# Patient Record
Sex: FEMALE | Race: WHITE | NOT HISPANIC OR LATINO | Employment: OTHER | URBAN - NONMETROPOLITAN AREA
[De-identification: names, ages, dates, MRNs, and addresses within clinical notes are randomized per-mention and may not be internally consistent; named-entity substitution may affect disease eponyms.]

---

## 2017-03-14 ENCOUNTER — OFFICE VISIT (OUTPATIENT)
Dept: OBSTETRICS AND GYNECOLOGY | Facility: CLINIC | Age: 37
End: 2017-03-14

## 2017-03-14 VITALS
SYSTOLIC BLOOD PRESSURE: 132 MMHG | DIASTOLIC BLOOD PRESSURE: 90 MMHG | WEIGHT: 216 LBS | BODY MASS INDEX: 33.9 KG/M2 | HEIGHT: 67 IN

## 2017-03-14 DIAGNOSIS — Z01.419 ENCOUNTER FOR GYNECOLOGICAL EXAMINATION WITHOUT ABNORMAL FINDING: Primary | ICD-10-CM

## 2017-03-14 DIAGNOSIS — Z12.4 SCREENING FOR MALIGNANT NEOPLASM OF CERVIX: ICD-10-CM

## 2017-03-14 PROCEDURE — 99385 PREV VISIT NEW AGE 18-39: CPT | Performed by: PHYSICIAN ASSISTANT

## 2017-03-14 RX ORDER — OMEPRAZOLE 40 MG/1
40 CAPSULE, DELAYED RELEASE ORAL DAILY
COMMUNITY

## 2017-03-14 RX ORDER — ESCITALOPRAM OXALATE 20 MG/1
20 TABLET ORAL DAILY
COMMUNITY

## 2017-03-14 RX ORDER — ACETAMINOPHEN/DIPHENHYDRAMINE 500MG-25MG
TABLET ORAL
Refills: 0 | COMMUNITY
Start: 2017-02-23 | End: 2019-05-16

## 2017-03-14 RX ORDER — LOSARTAN POTASSIUM 50 MG/1
100 TABLET ORAL DAILY
COMMUNITY
End: 2019-05-16

## 2017-03-14 RX ORDER — ROPINIROLE 1 MG/1
TABLET, FILM COATED ORAL
Refills: 0 | COMMUNITY
Start: 2017-03-10 | End: 2019-05-16

## 2017-03-14 RX ORDER — HYDROCODONE BITARTRATE AND ACETAMINOPHEN 10; 325 MG/1; MG/1
1 TABLET ORAL EVERY 8 HOURS PRN
Refills: 0 | COMMUNITY
Start: 2017-03-10 | End: 2022-05-12

## 2017-03-14 RX ORDER — RISPERIDONE 2 MG/1
TABLET ORAL
Refills: 0 | COMMUNITY
Start: 2017-02-13 | End: 2019-05-16

## 2017-03-14 RX ORDER — GLUCOSAMINE/CHONDR SU A SOD 167-133 MG
CAPSULE ORAL
Refills: 0 | COMMUNITY
Start: 2017-02-23 | End: 2019-05-16

## 2017-03-14 RX ORDER — GABAPENTIN 300 MG/1
300 CAPSULE ORAL 3 TIMES DAILY
COMMUNITY
End: 2021-03-11

## 2017-03-14 RX ORDER — ATORVASTATIN CALCIUM 40 MG/1
40 TABLET, FILM COATED ORAL DAILY
COMMUNITY

## 2017-03-14 RX ORDER — GLUCOSAMINE/CHONDR SU A SOD 750-600 MG
TABLET ORAL
Refills: 0 | COMMUNITY
Start: 2017-02-13 | End: 2020-12-01

## 2017-03-14 NOTE — PROGRESS NOTES
Subjective   Chief Complaint   Patient presents with   • Gynecologic Exam   • Contraception     consult for iud removal     Kim Ray is a 36 y.o. year old  presenting to be seen for her annual exam.   She has no complaints or concerns today  She has Mirena placed 2012--she had placed for menorrhagia in [place]--has no bleeding with Mirena  She desires removal and reinsertioin of new Mirena  She has had tubal     Past Medical History   Diagnosis Date   • Acid reflux    • Anxiety    • Arthritis    • Depression         Current Outpatient Prescriptions:   •  atorvastatin (LIPITOR) 40 MG tablet, Take 40 mg by mouth Daily., Disp: , Rfl:   •  escitalopram (LEXAPRO) 20 MG tablet, Take 20 mg by mouth Daily., Disp: , Rfl:   •  gabapentin (NEURONTIN) 300 MG capsule, Take 300 mg by mouth 3 (Three) Times a Day., Disp: , Rfl:   •  HYDROcodone-acetaminophen (NORCO)  MG per tablet, take 1 tablet by mouth every 8 hours, Disp: , Rfl: 0  •  losartan (COZAAR) 50 MG tablet, Take 100 mg by mouth Daily., Disp: , Rfl:   •  omeprazole (priLOSEC) 40 MG capsule, Take 40 mg by mouth Daily., Disp: , Rfl:   •  RA ASPIRIN EC ADULT LOW ST 81 MG EC tablet, , Disp: , Rfl: 0  •  RA NIACIN 500 MG tablet, , Disp: , Rfl: 0  •  RA VITAMIN D-3 2000 UNITS capsule, take 1 capsule by mouth once daily, Disp: , Rfl: 0  •  risperiDONE (risperDAL) 2 MG tablet, , Disp: , Rfl: 0  •  rOPINIRole (REQUIP) 1 MG tablet, take 1 tablet by mouth 1 TO 3 HOURS BEFORE BEDTIME  daily, Disp: , Rfl: 0    Current Facility-Administered Medications:   •  [START ON 3/15/2017] levonorgestrel (MIRENA) 20 MCG/24HR IUD 1 each, 1 each, Intrauterine, Once, Janette Coleman PA-C   Allergies   Allergen Reactions   • Bactrim [Sulfamethoxazole-Trimethoprim]    • Ciprofloxacin    • Ibuprofen    • Penicillins       Past Surgical History   Procedure Laterality Date   • Cervical biopsy  w/ loop electrode excision       10/2000, 2011   •  section     • Tubal  "abdominal ligation        Social History     Social History   • Marital status:      Spouse name: N/A   • Number of children: N/A   • Years of education: N/A     Occupational History   • Not on file.     Social History Main Topics   • Smoking status: Never Smoker   • Smokeless tobacco: Not on file   • Alcohol use No   • Drug use: No   • Sexual activity: Defer     Other Topics Concern   • Not on file     Social History Narrative   • No narrative on file      Family History   Problem Relation Age of Onset   • Diabetes Father    • Hyperlipidemia Mother    • Hypertension Mother    • Thyroid disease Sister    • Diabetes Paternal Grandmother    • Skin cancer Maternal Grandfather        The following portions of the patient's history were reviewed and updated as appropriate:problem list, current medications, allergies, past family history, past medical history, past social history and past surgical history.    Review of Systems   Constitutional: Negative.    HENT: Positive for congestion.    Respiratory: Positive for cough.    Musculoskeletal: Positive for arthralgias.   Psychiatric/Behavioral: Positive for dysphoric mood.           Objective   Visit Vitals   • /90   • Ht 67\" (170.2 cm)   • Wt 216 lb (98 kg)   • LMP 01/01/2012   • BMI 33.83 kg/m2       Physical Exam   Constitutional: She appears well-developed and well-nourished.   Pulmonary/Chest: Right breast exhibits no inverted nipple, no mass, no nipple discharge, no skin change and no tenderness. Left breast exhibits no inverted nipple, no mass, no nipple discharge, no skin change and no tenderness.   Abdominal: Soft. Normal appearance. She exhibits no distension and no mass. There is no tenderness.   Genitourinary: Vagina normal and uterus normal. There is no tenderness or lesion on the right labia. There is no tenderness or lesion on the left labia. Cervix exhibits no motion tenderness and no discharge. Right adnexum displays no mass and no tenderness. " Left adnexum displays no mass and no tenderness.   Genitourinary Comments: IUD STRINGS VISABLE   Skin: Skin is warm and dry.   Psychiatric: She has a normal mood and affect. Her behavior is normal.            Assessment /Plan      Kim was seen today for gynecologic exam and contraception.    Diagnoses and all orders for this visit:    Encounter for gynecological examination without abnormal finding  -     levonorgestrel (MIRENA) 20 MCG/24HR IUD 1 each; 1 each by Intrauterine route 1 (One) Time.    Screening for malignant neoplasm of cervix  -     Liquid-based Pap Smear, Screening; Future                 This note was electronically signed.    Janette Coleman PA-C   March 14, 2017

## 2017-03-28 DIAGNOSIS — Z12.4 SCREENING FOR MALIGNANT NEOPLASM OF CERVIX: ICD-10-CM

## 2017-04-10 ENCOUNTER — OFFICE VISIT (OUTPATIENT)
Dept: OBSTETRICS AND GYNECOLOGY | Facility: CLINIC | Age: 37
End: 2017-04-10

## 2017-04-10 VITALS
DIASTOLIC BLOOD PRESSURE: 76 MMHG | HEIGHT: 66 IN | BODY MASS INDEX: 35.03 KG/M2 | SYSTOLIC BLOOD PRESSURE: 122 MMHG | WEIGHT: 218 LBS

## 2017-04-10 DIAGNOSIS — Z30.433 ENCOUNTER FOR REMOVAL AND REINSERTION OF INTRAUTERINE CONTRACEPTIVE DEVICE: Primary | ICD-10-CM

## 2017-04-10 LAB
B-HCG UR QL: NEGATIVE
INTERNAL NEGATIVE CONTROL: NEGATIVE
INTERNAL POSITIVE CONTROL: POSITIVE
Lab: NORMAL

## 2017-04-10 PROCEDURE — 81025 URINE PREGNANCY TEST: CPT | Performed by: PHYSICIAN ASSISTANT

## 2017-04-10 PROCEDURE — 58300 INSERT INTRAUTERINE DEVICE: CPT | Performed by: PHYSICIAN ASSISTANT

## 2017-04-10 PROCEDURE — 58301 REMOVE INTRAUTERINE DEVICE: CPT | Performed by: PHYSICIAN ASSISTANT

## 2017-04-10 NOTE — PROGRESS NOTES
IUD Removal and Immediate Reinsertion    Patient's last menstrual period was 01/01/2012.    Date of procedure:  4/10/2017    Risks and benefits discussed? yes  All questions answered? yes  Consents given by the patient  Written consent obtained? yes  Reason for removal: Device expiration    Local anesthesia used:  no    Procedure documentation:    A speculum was placed in order to view the cervix.  A tenaculum did not need to be placed on the anterior cervical lip.  Cervical dilation did not need to be performed in order to access the string.  The IUD string was easily seen.  The string was grasped and the IUD was removed without difficulty.  The IUD did not appear to be adherent to the uterine cavity. It was removed intact.    A sterile speculum was replaced and the cervix was cleansed with an antiseptic solution.  Vaginal discharge was scant.  The anterior lip of the cervix was grasped with a tenaculum and the uterine cavity was gently sounded.  There was no difficulty passing the sound through the cervix.  Cervical dilation did not need to be performed prior to placing the IUD.  The uterus was anteverted and sounded to 7 cms.  The Mirena was then prepared per the manufacturers instructions.    The Mirena was advanced to a point 2 cms from the fundus and then the arms were released from the sheath.  The device was advanced to the fundus and the device was released fully from the sheath.. The string was cut 3 cms in length.  Bleeding from the cervix was scant.    She tolerated the procedure without any difficulty.     Post procedure instructions: Follow up in 5 weeks for IUD check--call office if any heavy bleeding, significant pain or cramping, fever or chills.       This note was electronically signed.  Janette Magdaleno  April 10, 2017

## 2017-05-15 ENCOUNTER — OFFICE VISIT (OUTPATIENT)
Dept: OBSTETRICS AND GYNECOLOGY | Facility: CLINIC | Age: 37
End: 2017-05-15

## 2017-05-15 VITALS
WEIGHT: 218 LBS | SYSTOLIC BLOOD PRESSURE: 132 MMHG | HEIGHT: 66 IN | DIASTOLIC BLOOD PRESSURE: 90 MMHG | BODY MASS INDEX: 35.03 KG/M2

## 2017-05-15 DIAGNOSIS — Z30.431 ENCOUNTER FOR ROUTINE CHECKING OF INTRAUTERINE CONTRACEPTIVE DEVICE: Primary | ICD-10-CM

## 2017-05-15 PROCEDURE — 99212 OFFICE O/P EST SF 10 MIN: CPT | Performed by: PHYSICIAN ASSISTANT

## 2019-04-02 ENCOUNTER — PREP FOR SURGERY (OUTPATIENT)
Dept: OTHER | Facility: HOSPITAL | Age: 39
End: 2019-04-02

## 2019-04-02 ENCOUNTER — OFFICE VISIT (OUTPATIENT)
Dept: NEUROSURGERY | Facility: CLINIC | Age: 39
End: 2019-04-02

## 2019-04-02 VITALS — BODY MASS INDEX: 35.03 KG/M2 | WEIGHT: 218 LBS | HEIGHT: 66 IN

## 2019-04-02 DIAGNOSIS — M48.02 CERVICAL STENOSIS OF SPINAL CANAL: Primary | ICD-10-CM

## 2019-04-02 DIAGNOSIS — M50.20 HERNIATION OF CERVICAL INTERVERTEBRAL DISC: Primary | ICD-10-CM

## 2019-04-02 DIAGNOSIS — M50.20 HERNIATION OF CERVICAL INTERVERTEBRAL DISC: ICD-10-CM

## 2019-04-02 PROCEDURE — 99243 OFF/OP CNSLTJ NEW/EST LOW 30: CPT | Performed by: NEUROLOGICAL SURGERY

## 2019-04-02 NOTE — PROGRESS NOTES
Subjective   Patient ID: Kim Ray is a 38 y.o. female is being seen for consultation today at the request of Bonifacio Castillo, *  Chief Complaint: Right shoulder and arm pain    History of Present Illness: The patient is a 38-year-old woman from [facility] with a history of pain in the neck intermittently for many years often treated with physical therapy.  She developed a more constant neck to the right shoulder and arm several months ago and this has not seen any relief with therapy or medications.  She has not had any change of gait or balance, no numbness or weakness or clumsiness of her hands.    Review of Radiographic Studies:  Cervical MRI scan shows severe cervical spinal stenosis at C5-6 and C6-7 with displacement and flattening of the cervical spinal cord, and at C6-7 on the right there is a distinct herniation at the foraminal level.  There is a mild kyphotic curve at the C5-7 level.    The following portions of the patient's history were reviewed, updated as appropriate and approved: allergies, current medications, past family history, past medical history, past social history, past surgical history, review of systems and problem list.  Review of Systems   Constitutional: Negative for activity change, appetite change, chills, diaphoresis, fatigue, fever and unexpected weight change.   HENT: Negative for congestion, dental problem, drooling, ear discharge, ear pain, facial swelling, hearing loss, mouth sores, nosebleeds, postnasal drip, rhinorrhea, sinus pressure, sneezing, sore throat, tinnitus, trouble swallowing and voice change.    Eyes: Negative for photophobia, pain, discharge, redness, itching and visual disturbance.   Respiratory: Negative for apnea, cough, choking, chest tightness, shortness of breath, wheezing and stridor.    Cardiovascular: Negative for chest pain, palpitations and leg swelling.   Gastrointestinal: Negative for abdominal distention, abdominal pain, anal  bleeding, blood in stool, constipation, diarrhea, nausea, rectal pain and vomiting.   Endocrine: Negative for cold intolerance, heat intolerance, polydipsia, polyphagia and polyuria.   Genitourinary: Negative for decreased urine volume, difficulty urinating, dysuria, enuresis, flank pain, frequency, genital sores, hematuria and urgency.   Musculoskeletal: Positive for arthralgias, back pain and neck pain. Negative for gait problem, joint swelling, myalgias and neck stiffness.   Skin: Negative for color change, pallor, rash and wound.   Allergic/Immunologic: Negative for environmental allergies, food allergies and immunocompromised state.   Neurological: Positive for weakness and headaches. Negative for dizziness, tremors, seizures, syncope, facial asymmetry, speech difficulty, light-headedness and numbness.   Hematological: Negative for adenopathy. Does not bruise/bleed easily.   Psychiatric/Behavioral: Negative for agitation, behavioral problems, confusion, decreased concentration, dysphoric mood, hallucinations, self-injury, sleep disturbance and suicidal ideas. The patient is not nervous/anxious and is not hyperactive.        Objective     NEUROLOGICAL EXAMINATION:      MENTAL STATUS:  Alert and oriented.  Speech intact.  Recent and remote memory intact.      CRANIAL NERVES:  Cranial nerve II:  Visual fields are full.  Cranial nerves III, IV and VI:  PERRLADC.  Extraocular movements are intact.  Nystagmus is not present.  Cranial nerve V:  Facial sensation is intact.  Cranial nerve VII:  Muscles of facial expression reveal no asymmetry.  Cranial nerve VIII:  Hearing is intact.  Cranial nerves IX and X:  Palate elevates symmetrically.  Cranial nerve XI:  Shoulder shrug is intact.  Cranial nerve XII:  Tongue is midline without evidence of atrophy or fasciculation.    MUSCULOSKELETAL: Shoulder range of motion intact.  Cervical range of motion intact.    MOTOR: Intact deltoid, biceps, triceps bilaterally.  No hand  atrophy.  No gait instability.    SENSATION: No focal sensory loss.    REFLEXES:  DTR 1+ biceps, triceps, knee, and ankle bilaterally.    Assessment   Cervical disc herniation C6-7 right, right C7 radiculopathy; severe spinal stenosis with spinal cord compression C5-6 and C6-7 due to central cervical disc herniations, no active myelopathy at this time.       Plan   Recommend ACDF at C5-6 and C6-7 for relief of the spinal cord compression in view of the high degree of stenosis, and to relieve the right C7 radiculopathy.       Chang Lackey MD

## 2019-05-16 ENCOUNTER — APPOINTMENT (OUTPATIENT)
Dept: PREADMISSION TESTING | Facility: HOSPITAL | Age: 39
End: 2019-05-16

## 2019-05-16 ENCOUNTER — ANESTHESIA EVENT (OUTPATIENT)
Dept: PERIOP | Facility: HOSPITAL | Age: 39
End: 2019-05-16

## 2019-05-16 VITALS — HEIGHT: 66 IN | BODY MASS INDEX: 32.03 KG/M2 | WEIGHT: 199.3 LBS

## 2019-05-16 DIAGNOSIS — M50.20 HERNIATION OF CERVICAL INTERVERTEBRAL DISC: ICD-10-CM

## 2019-05-16 LAB
ANION GAP SERPL CALCULATED.3IONS-SCNC: 14 MMOL/L
B-HCG UR QL: NEGATIVE
BUN BLD-MCNC: 15 MG/DL (ref 6–20)
BUN/CREAT SERPL: 22.1 (ref 7–25)
CALCIUM SPEC-SCNC: 9.2 MG/DL (ref 8.6–10.5)
CHLORIDE SERPL-SCNC: 101 MMOL/L (ref 98–107)
CO2 SERPL-SCNC: 25 MMOL/L (ref 22–29)
CREAT BLD-MCNC: 0.68 MG/DL (ref 0.57–1)
DEPRECATED RDW RBC AUTO: 43 FL (ref 37–54)
ERYTHROCYTE [DISTWIDTH] IN BLOOD BY AUTOMATED COUNT: 14.1 % (ref 12.3–15.4)
GFR SERPL CREATININE-BSD FRML MDRD: 96 ML/MIN/1.73
GLUCOSE BLD-MCNC: 145 MG/DL (ref 65–99)
HBA1C MFR BLD: 8.1 % (ref 4.8–5.6)
HCT VFR BLD AUTO: 45.1 % (ref 34–46.6)
HGB BLD-MCNC: 15 G/DL (ref 12–15.9)
MCH RBC QN AUTO: 27.8 PG (ref 26.6–33)
MCHC RBC AUTO-ENTMCNC: 33.3 G/DL (ref 31.5–35.7)
MCV RBC AUTO: 83.7 FL (ref 79–97)
MRSA DNA SPEC QL NAA+PROBE: NEGATIVE
PLATELET # BLD AUTO: 304 10*3/MM3 (ref 140–450)
PMV BLD AUTO: 11 FL (ref 6–12)
POTASSIUM BLD-SCNC: 4.3 MMOL/L (ref 3.5–5.2)
RBC # BLD AUTO: 5.39 10*6/MM3 (ref 3.77–5.28)
SODIUM BLD-SCNC: 140 MMOL/L (ref 136–145)
WBC NRBC COR # BLD: 17.19 10*3/MM3 (ref 3.4–10.8)

## 2019-05-16 PROCEDURE — 80048 BASIC METABOLIC PNL TOTAL CA: CPT | Performed by: PHYSICIAN ASSISTANT

## 2019-05-16 PROCEDURE — 83036 HEMOGLOBIN GLYCOSYLATED A1C: CPT | Performed by: ANESTHESIOLOGY

## 2019-05-16 PROCEDURE — 81025 URINE PREGNANCY TEST: CPT | Performed by: PHYSICIAN ASSISTANT

## 2019-05-16 PROCEDURE — 85027 COMPLETE CBC AUTOMATED: CPT | Performed by: PHYSICIAN ASSISTANT

## 2019-05-16 PROCEDURE — 87641 MR-STAPH DNA AMP PROBE: CPT | Performed by: PHYSICIAN ASSISTANT

## 2019-05-16 PROCEDURE — 93010 ELECTROCARDIOGRAM REPORT: CPT | Performed by: INTERNAL MEDICINE

## 2019-05-16 PROCEDURE — 93005 ELECTROCARDIOGRAM TRACING: CPT

## 2019-05-16 PROCEDURE — 36415 COLL VENOUS BLD VENIPUNCTURE: CPT

## 2019-05-16 RX ORDER — RISPERIDONE 4 MG/1
4 TABLET ORAL DAILY
COMMUNITY

## 2019-05-16 RX ORDER — FAMOTIDINE 10 MG/ML
20 INJECTION, SOLUTION INTRAVENOUS ONCE
Status: CANCELLED | OUTPATIENT
Start: 2019-05-16 | End: 2019-05-16

## 2019-05-16 RX ORDER — BUPROPION HYDROCHLORIDE 150 MG/1
150 TABLET, EXTENDED RELEASE ORAL 2 TIMES DAILY
COMMUNITY
End: 2019-06-18 | Stop reason: SDUPTHER

## 2019-05-16 RX ORDER — CETIRIZINE HYDROCHLORIDE 10 MG/1
10 TABLET ORAL DAILY
COMMUNITY

## 2019-05-16 RX ORDER — OXYBUTYNIN CHLORIDE 15 MG/1
15 TABLET, EXTENDED RELEASE ORAL DAILY
COMMUNITY
End: 2021-01-06

## 2019-05-16 RX ORDER — LOSARTAN POTASSIUM 100 MG/1
100 TABLET ORAL DAILY
COMMUNITY

## 2019-05-16 RX ORDER — FENOFIBRATE 160 MG/1
160 TABLET ORAL DAILY
COMMUNITY

## 2019-05-16 RX ORDER — ROPINIROLE 3 MG/1
3 TABLET, FILM COATED ORAL NIGHTLY
COMMUNITY

## 2019-05-16 NOTE — PAT
BACTROBAN APPLIED TO EACH NOSTRIL DURING PAT VISIT     Patient to apply Chlorhexadine wipes  to surgical area (as instructed) the night before procedure and the AM of procedure. Wipes provided.    ekg cleared per dr tang    Patient instructed to drink 20 ounces (or until full) of Gatorade or 20 ounces of G2 (if diabetic) and complete 1 hour before arrival time for procedure (NO RED Gatorade or G2)    Patient verbalized understanding.

## 2019-05-17 ENCOUNTER — APPOINTMENT (OUTPATIENT)
Dept: GENERAL RADIOLOGY | Facility: HOSPITAL | Age: 39
End: 2019-05-17

## 2019-05-17 ENCOUNTER — HOSPITAL ENCOUNTER (INPATIENT)
Facility: HOSPITAL | Age: 39
LOS: 1 days | Discharge: HOME | End: 2019-05-18
Attending: NEUROLOGICAL SURGERY | Admitting: NEUROLOGICAL SURGERY

## 2019-05-17 ENCOUNTER — ANESTHESIA (OUTPATIENT)
Dept: PERIOP | Facility: HOSPITAL | Age: 39
End: 2019-05-17

## 2019-05-17 DIAGNOSIS — Z01.419 ENCOUNTER FOR GYNECOLOGICAL EXAMINATION WITHOUT ABNORMAL FINDING: ICD-10-CM

## 2019-05-17 PROBLEM — M50.20 CERVICAL DISC HERNIATION: Status: ACTIVE | Noted: 2019-05-17

## 2019-05-17 LAB
B-HCG UR QL: NEGATIVE
GLUCOSE BLDC GLUCOMTR-MCNC: 136 MG/DL (ref 70–130)
GLUCOSE BLDC GLUCOMTR-MCNC: 150 MG/DL (ref 70–130)
GLUCOSE BLDC GLUCOMTR-MCNC: 154 MG/DL (ref 70–130)
GLUCOSE BLDC GLUCOMTR-MCNC: 169 MG/DL (ref 70–130)
INTERNAL NEGATIVE CONTROL: NEGATIVE
INTERNAL POSITIVE CONTROL: POSITIVE
Lab: NORMAL
POTASSIUM BLD-SCNC: 4.6 MMOL/L (ref 3.5–5.2)

## 2019-05-17 PROCEDURE — 25010000002 FENTANYL CITRATE (PF) 100 MCG/2ML SOLUTION: Performed by: NURSE ANESTHETIST, CERTIFIED REGISTERED

## 2019-05-17 PROCEDURE — 22551 ARTHRD ANT NTRBDY CERVICAL: CPT | Performed by: NEUROLOGICAL SURGERY

## 2019-05-17 PROCEDURE — 25010000002 HYDROMORPHONE 1 MG/ML SOLUTION: Performed by: NEUROLOGICAL SURGERY

## 2019-05-17 PROCEDURE — 0RG20A0 FUSION OF 2 OR MORE CERVICAL VERTEBRAL JOINTS WITH INTERBODY FUSION DEVICE, ANTERIOR APPROACH, ANTERIOR COLUMN, OPEN APPROACH: ICD-10-PCS | Performed by: NEUROLOGICAL SURGERY

## 2019-05-17 PROCEDURE — 63710000001 INSULIN LISPRO (HUMAN) PER 5 UNITS: Performed by: NEUROLOGICAL SURGERY

## 2019-05-17 PROCEDURE — C1713 ANCHOR/SCREW BN/BN,TIS/BN: HCPCS | Performed by: NEUROLOGICAL SURGERY

## 2019-05-17 PROCEDURE — 25010000002 PHENYLEPHRINE PER 1 ML: Performed by: NURSE ANESTHETIST, CERTIFIED REGISTERED

## 2019-05-17 PROCEDURE — 0RT30ZZ RESECTION OF CERVICAL VERTEBRAL DISC, OPEN APPROACH: ICD-10-PCS | Performed by: NEUROLOGICAL SURGERY

## 2019-05-17 PROCEDURE — 22552 ARTHRD ANT NTRBD CERVICAL EA: CPT | Performed by: NEUROLOGICAL SURGERY

## 2019-05-17 PROCEDURE — 25010000002 HYDROMORPHONE PER 4 MG: Performed by: NURSE ANESTHETIST, CERTIFIED REGISTERED

## 2019-05-17 PROCEDURE — 25010000002 ONDANSETRON PER 1 MG: Performed by: NURSE ANESTHETIST, CERTIFIED REGISTERED

## 2019-05-17 PROCEDURE — 25010000002 NEOSTIGMINE 10 MG/10ML SOLUTION: Performed by: NURSE ANESTHETIST, CERTIFIED REGISTERED

## 2019-05-17 PROCEDURE — 25010000002 MIDAZOLAM PER 1 MG: Performed by: ANESTHESIOLOGY

## 2019-05-17 PROCEDURE — 22853 INSJ BIOMECHANICAL DEVICE: CPT | Performed by: PHYSICIAN ASSISTANT

## 2019-05-17 PROCEDURE — 22845 INSERT SPINE FIXATION DEVICE: CPT | Performed by: PHYSICIAN ASSISTANT

## 2019-05-17 PROCEDURE — 84132 ASSAY OF SERUM POTASSIUM: CPT | Performed by: ANESTHESIOLOGY

## 2019-05-17 PROCEDURE — 99024 POSTOP FOLLOW-UP VISIT: CPT | Performed by: NEUROLOGICAL SURGERY

## 2019-05-17 PROCEDURE — 22552 ARTHRD ANT NTRBD CERVICAL EA: CPT | Performed by: PHYSICIAN ASSISTANT

## 2019-05-17 PROCEDURE — 82962 GLUCOSE BLOOD TEST: CPT

## 2019-05-17 PROCEDURE — 22845 INSERT SPINE FIXATION DEVICE: CPT | Performed by: NEUROLOGICAL SURGERY

## 2019-05-17 PROCEDURE — 25010000002 PROPOFOL 10 MG/ML EMULSION: Performed by: NURSE ANESTHETIST, CERTIFIED REGISTERED

## 2019-05-17 PROCEDURE — 25010000002 DEXAMETHASONE PER 1 MG: Performed by: NURSE ANESTHETIST, CERTIFIED REGISTERED

## 2019-05-17 PROCEDURE — 72020 X-RAY EXAM OF SPINE 1 VIEW: CPT

## 2019-05-17 PROCEDURE — 81025 URINE PREGNANCY TEST: CPT | Performed by: ANESTHESIOLOGY

## 2019-05-17 PROCEDURE — 94799 UNLISTED PULMONARY SVC/PX: CPT

## 2019-05-17 PROCEDURE — 25010000002 VANCOMYCIN 10 G RECONSTITUTED SOLUTION: Performed by: PHYSICIAN ASSISTANT

## 2019-05-17 PROCEDURE — G0378 HOSPITAL OBSERVATION PER HR: HCPCS

## 2019-05-17 PROCEDURE — 22551 ARTHRD ANT NTRBDY CERVICAL: CPT | Performed by: PHYSICIAN ASSISTANT

## 2019-05-17 PROCEDURE — 22853 INSJ BIOMECHANICAL DEVICE: CPT | Performed by: NEUROLOGICAL SURGERY

## 2019-05-17 DEVICE — WAX BONE HEMO AESCULAP 2.5GM: Type: IMPLANTABLE DEVICE | Site: SPINE CERVICAL | Status: FUNCTIONAL

## 2019-05-17 DEVICE — SCREW 7723515 ZEVO VAR ST 3.5MM X 15MM
Type: IMPLANTABLE DEVICE | Site: SPINE CERVICAL | Status: FUNCTIONAL
Brand: ZEVO™ ANTERIOR CERVICAL PLATE SYSTEM

## 2019-05-17 DEVICE — CAGE 5030664 ANATOMIC PTC 16X14X6MM
Type: IMPLANTABLE DEVICE | Site: SPINE CERVICAL | Status: FUNCTIONAL
Brand: ANATOMIC PEEK PTC CERVICAL FUSION SYSTEM

## 2019-05-17 DEVICE — CLIP LIGAT VASC HORIZON TI SM YEL 6CT: Type: IMPLANTABLE DEVICE | Site: SPINE CERVICAL | Status: FUNCTIONAL

## 2019-05-17 DEVICE — CLIP LIGAT VASC HORIZON TI MD BLU 6CT: Type: IMPLANTABLE DEVICE | Site: SPINE CERVICAL | Status: FUNCTIONAL

## 2019-05-17 DEVICE — PUTTY DBM GRAFTON 6CC: Type: IMPLANTABLE DEVICE | Site: SPINE CERVICAL | Status: FUNCTIONAL

## 2019-05-17 RX ORDER — SODIUM CHLORIDE 0.9 % (FLUSH) 0.9 %
3 SYRINGE (ML) INJECTION EVERY 12 HOURS SCHEDULED
Status: DISCONTINUED | OUTPATIENT
Start: 2019-05-17 | End: 2019-05-18

## 2019-05-17 RX ORDER — GABAPENTIN 300 MG/1
300 CAPSULE ORAL 3 TIMES DAILY
Status: DISCONTINUED | OUTPATIENT
Start: 2019-05-17 | End: 2019-05-18

## 2019-05-17 RX ORDER — CYCLOBENZAPRINE HCL 10 MG
10 TABLET ORAL 3 TIMES DAILY PRN
Status: DISCONTINUED | OUTPATIENT
Start: 2019-05-17 | End: 2019-05-18

## 2019-05-17 RX ORDER — ESCITALOPRAM OXALATE 20 MG/1
20 TABLET ORAL NIGHTLY
Status: DISCONTINUED | OUTPATIENT
Start: 2019-05-17 | End: 2019-05-18

## 2019-05-17 RX ORDER — SODIUM CHLORIDE 0.9 % (FLUSH) 0.9 %
3-10 SYRINGE (ML) INJECTION AS NEEDED
Status: DISCONTINUED | OUTPATIENT
Start: 2019-05-17 | End: 2019-05-18

## 2019-05-17 RX ORDER — LIDOCAINE HYDROCHLORIDE 10 MG/ML
INJECTION, SOLUTION EPIDURAL; INFILTRATION; INTRACAUDAL; PERINEURAL AS NEEDED
Status: DISCONTINUED | OUTPATIENT
Start: 2019-05-17 | End: 2019-05-17 | Stop reason: SURG

## 2019-05-17 RX ORDER — ROCURONIUM BROMIDE 10 MG/ML
INJECTION, SOLUTION INTRAVENOUS AS NEEDED
Status: DISCONTINUED | OUTPATIENT
Start: 2019-05-17 | End: 2019-05-17 | Stop reason: SURG

## 2019-05-17 RX ORDER — ONDANSETRON 2 MG/ML
INJECTION INTRAMUSCULAR; INTRAVENOUS AS NEEDED
Status: DISCONTINUED | OUTPATIENT
Start: 2019-05-17 | End: 2019-05-17 | Stop reason: SURG

## 2019-05-17 RX ORDER — ONDANSETRON 2 MG/ML
4 INJECTION INTRAMUSCULAR; INTRAVENOUS ONCE AS NEEDED
Status: DISCONTINUED | OUTPATIENT
Start: 2019-05-17 | End: 2019-05-17 | Stop reason: HOSPADM

## 2019-05-17 RX ORDER — RISPERIDONE 1 MG/1
4 TABLET ORAL NIGHTLY
Status: DISCONTINUED | OUTPATIENT
Start: 2019-05-17 | End: 2019-05-18

## 2019-05-17 RX ORDER — SODIUM CHLORIDE, SODIUM LACTATE, POTASSIUM CHLORIDE, CALCIUM CHLORIDE 600; 310; 30; 20 MG/100ML; MG/100ML; MG/100ML; MG/100ML
50 INJECTION, SOLUTION INTRAVENOUS CONTINUOUS
Status: DISCONTINUED | OUTPATIENT
Start: 2019-05-17 | End: 2019-05-18

## 2019-05-17 RX ORDER — GLYCOPYRROLATE 0.2 MG/ML
INJECTION INTRAMUSCULAR; INTRAVENOUS AS NEEDED
Status: DISCONTINUED | OUTPATIENT
Start: 2019-05-17 | End: 2019-05-17 | Stop reason: SURG

## 2019-05-17 RX ORDER — NICOTINE POLACRILEX 4 MG
15 LOZENGE BUCCAL
Status: DISCONTINUED | OUTPATIENT
Start: 2019-05-17 | End: 2019-05-18

## 2019-05-17 RX ORDER — MIDAZOLAM HYDROCHLORIDE 5 MG/ML
2 INJECTION INTRAMUSCULAR; INTRAVENOUS ONCE
Status: COMPLETED | OUTPATIENT
Start: 2019-05-17 | End: 2019-05-17

## 2019-05-17 RX ORDER — TEMAZEPAM 15 MG/1
15 CAPSULE ORAL NIGHTLY PRN
Status: DISCONTINUED | OUTPATIENT
Start: 2019-05-17 | End: 2019-05-18

## 2019-05-17 RX ORDER — ACETAMINOPHEN 325 MG/1
650 TABLET ORAL EVERY 4 HOURS PRN
Status: DISCONTINUED | OUTPATIENT
Start: 2019-05-17 | End: 2019-05-18

## 2019-05-17 RX ORDER — PANTOPRAZOLE SODIUM 40 MG/1
40 TABLET, DELAYED RELEASE ORAL EVERY MORNING
Status: DISCONTINUED | OUTPATIENT
Start: 2019-05-18 | End: 2019-05-18

## 2019-05-17 RX ORDER — CETIRIZINE HYDROCHLORIDE 10 MG/1
10 TABLET ORAL DAILY
Status: DISCONTINUED | OUTPATIENT
Start: 2019-05-18 | End: 2019-05-18

## 2019-05-17 RX ORDER — DEXAMETHASONE SODIUM PHOSPHATE 4 MG/ML
INJECTION, SOLUTION INTRA-ARTICULAR; INTRALESIONAL; INTRAMUSCULAR; INTRAVENOUS; SOFT TISSUE AS NEEDED
Status: DISCONTINUED | OUTPATIENT
Start: 2019-05-17 | End: 2019-05-17 | Stop reason: SURG

## 2019-05-17 RX ORDER — NALOXONE HCL 0.4 MG/ML
0.4 VIAL (ML) INJECTION
Status: DISCONTINUED | OUTPATIENT
Start: 2019-05-17 | End: 2019-05-18

## 2019-05-17 RX ORDER — SENNA AND DOCUSATE SODIUM 50; 8.6 MG/1; MG/1
1 TABLET, FILM COATED ORAL NIGHTLY PRN
Status: DISCONTINUED | OUTPATIENT
Start: 2019-05-17 | End: 2019-05-18

## 2019-05-17 RX ORDER — NEOSTIGMINE METHYLSULFATE 1 MG/ML
INJECTION, SOLUTION INTRAVENOUS AS NEEDED
Status: DISCONTINUED | OUTPATIENT
Start: 2019-05-17 | End: 2019-05-17 | Stop reason: SURG

## 2019-05-17 RX ORDER — SODIUM CHLORIDE, SODIUM LACTATE, POTASSIUM CHLORIDE, CALCIUM CHLORIDE 600; 310; 30; 20 MG/100ML; MG/100ML; MG/100ML; MG/100ML
9 INJECTION, SOLUTION INTRAVENOUS CONTINUOUS
Status: DISCONTINUED | OUTPATIENT
Start: 2019-05-17 | End: 2019-05-18

## 2019-05-17 RX ORDER — MAGNESIUM HYDROXIDE 1200 MG/15ML
LIQUID ORAL AS NEEDED
Status: DISCONTINUED | OUTPATIENT
Start: 2019-05-17 | End: 2019-05-17 | Stop reason: HOSPADM

## 2019-05-17 RX ORDER — ONDANSETRON 2 MG/ML
4 INJECTION INTRAMUSCULAR; INTRAVENOUS EVERY 6 HOURS PRN
Status: DISCONTINUED | OUTPATIENT
Start: 2019-05-17 | End: 2019-05-18

## 2019-05-17 RX ORDER — SODIUM CHLORIDE 0.9 % (FLUSH) 0.9 %
3-10 SYRINGE (ML) INJECTION AS NEEDED
Status: DISCONTINUED | OUTPATIENT
Start: 2019-05-17 | End: 2019-05-17 | Stop reason: HOSPADM

## 2019-05-17 RX ORDER — ONDANSETRON 4 MG/1
4 TABLET, FILM COATED ORAL EVERY 6 HOURS PRN
Status: DISCONTINUED | OUTPATIENT
Start: 2019-05-17 | End: 2019-05-18

## 2019-05-17 RX ORDER — PROPOFOL 10 MG/ML
VIAL (ML) INTRAVENOUS AS NEEDED
Status: DISCONTINUED | OUTPATIENT
Start: 2019-05-17 | End: 2019-05-17 | Stop reason: SURG

## 2019-05-17 RX ORDER — FENTANYL CITRATE 50 UG/ML
50 INJECTION, SOLUTION INTRAMUSCULAR; INTRAVENOUS
Status: DISCONTINUED | OUTPATIENT
Start: 2019-05-17 | End: 2019-05-17 | Stop reason: HOSPADM

## 2019-05-17 RX ORDER — HYDROMORPHONE HYDROCHLORIDE 1 MG/ML
0.5 INJECTION, SOLUTION INTRAMUSCULAR; INTRAVENOUS; SUBCUTANEOUS
Status: DISCONTINUED | OUTPATIENT
Start: 2019-05-17 | End: 2019-05-17 | Stop reason: HOSPADM

## 2019-05-17 RX ORDER — BUPROPION HYDROCHLORIDE 150 MG/1
150 TABLET, EXTENDED RELEASE ORAL EVERY 12 HOURS SCHEDULED
Status: DISCONTINUED | OUTPATIENT
Start: 2019-05-17 | End: 2019-05-18

## 2019-05-17 RX ORDER — SODIUM CHLORIDE 0.9 % (FLUSH) 0.9 %
3 SYRINGE (ML) INJECTION EVERY 12 HOURS SCHEDULED
Status: DISCONTINUED | OUTPATIENT
Start: 2019-05-17 | End: 2019-05-17 | Stop reason: HOSPADM

## 2019-05-17 RX ORDER — OXYCODONE AND ACETAMINOPHEN 10; 325 MG/1; MG/1
1 TABLET ORAL EVERY 4 HOURS PRN
Status: DISCONTINUED | OUTPATIENT
Start: 2019-05-17 | End: 2019-05-18

## 2019-05-17 RX ORDER — DEXTROSE MONOHYDRATE 25 G/50ML
25 INJECTION, SOLUTION INTRAVENOUS
Status: DISCONTINUED | OUTPATIENT
Start: 2019-05-17 | End: 2019-05-18

## 2019-05-17 RX ORDER — BISACODYL 10 MG
10 SUPPOSITORY, RECTAL RECTAL DAILY PRN
Status: DISCONTINUED | OUTPATIENT
Start: 2019-05-17 | End: 2019-05-18

## 2019-05-17 RX ORDER — FENOFIBRATE 145 MG/1
145 TABLET, COATED ORAL NIGHTLY
Status: DISCONTINUED | OUTPATIENT
Start: 2019-05-17 | End: 2019-05-18

## 2019-05-17 RX ORDER — LIDOCAINE HYDROCHLORIDE 10 MG/ML
0.5 INJECTION, SOLUTION EPIDURAL; INFILTRATION; INTRACAUDAL; PERINEURAL ONCE AS NEEDED
Status: COMPLETED | OUTPATIENT
Start: 2019-05-17 | End: 2019-05-17

## 2019-05-17 RX ORDER — FAMOTIDINE 20 MG/1
20 TABLET, FILM COATED ORAL ONCE
Status: COMPLETED | OUTPATIENT
Start: 2019-05-17 | End: 2019-05-17

## 2019-05-17 RX ORDER — FENTANYL CITRATE 50 UG/ML
INJECTION, SOLUTION INTRAMUSCULAR; INTRAVENOUS AS NEEDED
Status: DISCONTINUED | OUTPATIENT
Start: 2019-05-17 | End: 2019-05-17 | Stop reason: SURG

## 2019-05-17 RX ORDER — ATORVASTATIN CALCIUM 40 MG/1
40 TABLET, FILM COATED ORAL NIGHTLY
Status: DISCONTINUED | OUTPATIENT
Start: 2019-05-17 | End: 2019-05-18

## 2019-05-17 RX ORDER — LOSARTAN POTASSIUM 50 MG/1
100 TABLET ORAL NIGHTLY
Status: DISCONTINUED | OUTPATIENT
Start: 2019-05-17 | End: 2019-05-18

## 2019-05-17 RX ADMIN — HYDROMORPHONE HYDROCHLORIDE 1 MG: 1 INJECTION, SOLUTION INTRAMUSCULAR; INTRAVENOUS; SUBCUTANEOUS at 15:58

## 2019-05-17 RX ADMIN — GLYCOPYRROLATE 0.2 MG: 0.2 INJECTION, SOLUTION INTRAMUSCULAR; INTRAVENOUS at 07:57

## 2019-05-17 RX ADMIN — FENTANYL CITRATE 50 MCG: 50 INJECTION, SOLUTION INTRAMUSCULAR; INTRAVENOUS at 09:42

## 2019-05-17 RX ADMIN — LIDOCAINE HYDROCHLORIDE 0.5 ML: 10 INJECTION, SOLUTION EPIDURAL; INFILTRATION; INTRACAUDAL; PERINEURAL at 06:25

## 2019-05-17 RX ADMIN — ONDANSETRON 4 MG: 2 INJECTION INTRAMUSCULAR; INTRAVENOUS at 09:33

## 2019-05-17 RX ADMIN — PROPOFOL 100 MG: 10 INJECTION, EMULSION INTRAVENOUS at 07:27

## 2019-05-17 RX ADMIN — RISPERIDONE 4 MG: 1 TABLET ORAL at 21:27

## 2019-05-17 RX ADMIN — METFORMIN HYDROCHLORIDE 500 MG: 500 TABLET, FILM COATED ORAL at 17:55

## 2019-05-17 RX ADMIN — FENTANYL CITRATE 50 MCG: 50 INJECTION INTRAMUSCULAR; INTRAVENOUS at 10:30

## 2019-05-17 RX ADMIN — ESCITALOPRAM OXALATE 20 MG: 20 TABLET ORAL at 21:27

## 2019-05-17 RX ADMIN — FENOFIBRATE 145 MG: 145 TABLET ORAL at 21:27

## 2019-05-17 RX ADMIN — GLYCOPYRROLATE 0.6 MG: 0.2 INJECTION, SOLUTION INTRAMUSCULAR; INTRAVENOUS at 09:38

## 2019-05-17 RX ADMIN — GABAPENTIN 300 MG: 300 CAPSULE ORAL at 21:27

## 2019-05-17 RX ADMIN — GABAPENTIN 300 MG: 300 CAPSULE ORAL at 15:59

## 2019-05-17 RX ADMIN — PHENYLEPHRINE HYDROCHLORIDE 0.5 MCG/KG/MIN: 10 INJECTION INTRAVENOUS at 07:48

## 2019-05-17 RX ADMIN — PHENYLEPHRINE HYDROCHLORIDE 160 MCG: 10 INJECTION INTRAVENOUS at 07:46

## 2019-05-17 RX ADMIN — PROPOFOL 200 MG: 10 INJECTION, EMULSION INTRAVENOUS at 07:24

## 2019-05-17 RX ADMIN — SODIUM CHLORIDE, POTASSIUM CHLORIDE, SODIUM LACTATE AND CALCIUM CHLORIDE 9 ML/HR: 600; 310; 30; 20 INJECTION, SOLUTION INTRAVENOUS at 06:25

## 2019-05-17 RX ADMIN — PHENYLEPHRINE HYDROCHLORIDE 160 MCG: 10 INJECTION INTRAVENOUS at 07:41

## 2019-05-17 RX ADMIN — INSULIN LISPRO 2 UNITS: 100 INJECTION, SOLUTION INTRAVENOUS; SUBCUTANEOUS at 21:28

## 2019-05-17 RX ADMIN — HYDROMORPHONE HYDROCHLORIDE 1 MG: 1 INJECTION, SOLUTION INTRAMUSCULAR; INTRAVENOUS; SUBCUTANEOUS at 12:11

## 2019-05-17 RX ADMIN — DEXAMETHASONE SODIUM PHOSPHATE 8 MG: 4 INJECTION, SOLUTION INTRAMUSCULAR; INTRAVENOUS at 07:34

## 2019-05-17 RX ADMIN — VANCOMYCIN HYDROCHLORIDE 1500 MG: 10 INJECTION, POWDER, LYOPHILIZED, FOR SOLUTION INTRAVENOUS at 06:57

## 2019-05-17 RX ADMIN — ROPINIROLE 3 MG: 2 TABLET, FILM COATED ORAL at 21:27

## 2019-05-17 RX ADMIN — HYDROMORPHONE HYDROCHLORIDE 0.5 MG: 1 INJECTION, SOLUTION INTRAMUSCULAR; INTRAVENOUS; SUBCUTANEOUS at 10:20

## 2019-05-17 RX ADMIN — FAMOTIDINE 20 MG: 20 TABLET ORAL at 06:35

## 2019-05-17 RX ADMIN — MIDAZOLAM HYDROCHLORIDE 2 MG: 5 INJECTION, SOLUTION INTRAMUSCULAR; INTRAVENOUS at 07:14

## 2019-05-17 RX ADMIN — LIDOCAINE HYDROCHLORIDE 50 MG: 10 INJECTION, SOLUTION EPIDURAL; INFILTRATION; INTRACAUDAL; PERINEURAL at 07:24

## 2019-05-17 RX ADMIN — LOSARTAN POTASSIUM 100 MG: 50 TABLET ORAL at 21:28

## 2019-05-17 RX ADMIN — FENTANYL CITRATE 50 MCG: 50 INJECTION, SOLUTION INTRAMUSCULAR; INTRAVENOUS at 07:24

## 2019-05-17 RX ADMIN — OXYCODONE HYDROCHLORIDE AND ACETAMINOPHEN 1 TABLET: 10; 325 TABLET ORAL at 21:27

## 2019-05-17 RX ADMIN — ATORVASTATIN CALCIUM 40 MG: 40 TABLET, FILM COATED ORAL at 21:28

## 2019-05-17 RX ADMIN — BUPROPION HYDROCHLORIDE 150 MG: 150 TABLET, EXTENDED RELEASE ORAL at 21:28

## 2019-05-17 RX ADMIN — ROCURONIUM BROMIDE 50 MG: 10 INJECTION INTRAVENOUS at 07:24

## 2019-05-17 RX ADMIN — NEOSTIGMINE METHYLSULFATE 4.5 MG: 1 INJECTION, SOLUTION INTRAVENOUS at 09:38

## 2019-05-17 RX ADMIN — CYCLOBENZAPRINE HYDROCHLORIDE 10 MG: 10 TABLET, FILM COATED ORAL at 21:28

## 2019-05-17 RX ADMIN — INSULIN LISPRO 2 UNITS: 100 INJECTION, SOLUTION INTRAVENOUS; SUBCUTANEOUS at 12:58

## 2019-05-17 RX ADMIN — SODIUM CHLORIDE, POTASSIUM CHLORIDE, SODIUM LACTATE AND CALCIUM CHLORIDE: 600; 310; 30; 20 INJECTION, SOLUTION INTRAVENOUS at 09:39

## 2019-05-17 NOTE — PLAN OF CARE
Problem: Patient Care Overview  Goal: Plan of Care Review  Outcome: Ongoing (interventions implemented as appropriate)   05/17/19 1712   Coping/Psychosocial   Plan of Care Reviewed With patient   Plan of Care Review   Progress improving   OTHER   Outcome Summary Pt. alert oriented and pleasant. VSS. Pt. has ambulated in the menjivar with assist of 1 and gait belt, pt. is voiding. Will continue to monitor.       Problem: Laminectomy/Foraminotomy/Discectomy (Adult)  Goal: Signs and Symptoms of Listed Potential Problems Will be Absent, Minimized or Managed (Laminectomy/Foraminotomy/Discectomy)  Outcome: Ongoing (interventions implemented as appropriate)   05/17/19 1712   Goal/Outcome Evaluation   Problems Assessed (Laminectomy/Laminotomy/Discectomy) all   Problems Present (Laminectomy/otomy) none

## 2019-05-17 NOTE — ANESTHESIA POSTPROCEDURE EVALUATION
Patient: Kim Ray    Procedure Summary     Date:  05/17/19 Room / Location:   [facility]    Anesthesia Start:  0720 Anesthesia Stop:  1004    Procedure:  CERVICAL DISCECTOMY ANTERIOR WITH FUSION C5-6 AND C6-7 (N/A Spine Cervical) Diagnosis:       Cervical stenosis of spinal canal      Herniation of cervical intervertebral disc      (Cervical stenosis of spinal canal [M48.02])      (Herniation of cervical intervertebral disc [M50.20])    Surgeon:  Chang Lackey MD Provider:  Stefanie Salgado MD    Anesthesia Type:  general ASA Status:  3          Anesthesia Type: general  Last vitals  BP   97/52 (05/17/19 1004)   Temp   97.2 °F (36.2 °C) (05/17/19 1004)   Pulse   78 (05/17/19 1004)   Resp   14 (05/17/19 1004)     SpO2   92 % (05/17/19 1004)     Post Anesthesia Care and Evaluation    Patient location during evaluation: PACU  Patient participation: waiting for patient participation  Level of consciousness: responsive to painful stimuli  Pain management: adequate  Airway patency: patent  Anesthetic complications: No anesthetic complications  PONV Status: none  Cardiovascular status: hemodynamically stable and acceptable  Respiratory status: nonlabored ventilation, acceptable, nasal cannula and oral airway  Hydration status: acceptable

## 2019-05-17 NOTE — ANESTHESIA PROCEDURE NOTES
Airway  Urgency: elective    Airway not difficult    General Information and Staff    Patient location during procedure: OR  CRNA: Jono Salcedo CRNA    Indications and Patient Condition  Indications for airway management: airway protection    Preoxygenated: yes  MILS not maintained throughout  Mask difficulty assessment: 2 - vent by mask + OA or adjuvant +/- NMBA    Final Airway Details  Final airway type: endotracheal airway      Successful airway: ETT  Cuffed: yes   Successful intubation technique: video laryngoscopy (glidescope 3)  Facilitating devices/methods: intubating stylet  Endotracheal tube insertion site: oral  Blade type: glidescope.  Blade size: 3  ETT size (mm): 7.0  Cormack-Lehane Classification: grade I - full view of glottis  Placement verified by: chest auscultation and capnometry   Number of attempts at approach: 1    Additional Comments  Negative epigastric sounds, Breath sound equal bilaterally with symmetric chest rise and fall. Unable to insert Mac 3 into mouth due to very limited mouth opening and poor dentition with risk of extending neck due to neck injury

## 2019-05-17 NOTE — BRIEF OP NOTE
CERVICAL DISCECTOMY ANTERIOR WITH FUSION  Progress Note    Kim Ray  5/17/2019    Pre-op Diagnosis:   Cervical stenosis of spinal canal [M48.02]  Herniation of cervical intervertebral disc [M50.20]       Post-Op Diagnosis Codes:     * Cervical stenosis of spinal canal [M48.02]     * Herniation of cervical intervertebral disc [M50.20]    Procedure/CPT® Codes:      Procedure(s):  CERVICAL DISCECTOMY ANTERIOR WITH FUSION C5-6 AND C6-7    Surgeon(s):  Chang Lackey MD    Anesthesia: General    Staff:   Physician Assistant: Alyse Rivera PA-C  Radiology Technologist: Catina Patel RT  Scrub Person: Renee Eden RN; Paty Nugent  Vendor Representative: Maik Mata  Nursing Assistant: Eric Rosas    Estimated Blood Loss: minimal    Urine Voided: * No values recorded between 5/17/2019  7:20 AM and 5/17/2019  9:41 AM *    Specimens:                None          Drains:   Closed/Suction Drain 1 Anterior Neck Bulb 7 Fr. (Active)       Findings: Cervical spinal stenosis C5-6, C6-7, herniated disc C6-7 right    Complications: None      Chang Lackey MD     Date: 5/17/2019  Time: 9:41 AM

## 2019-05-17 NOTE — H&P
"Pre-Op H&P  Kim Ray  [id number]  1980    Chief complaint: right arm and shoulder pain    HPI:    Patient is a 38 y.o.female who presents with history of pain in the right upper extremity and shoulder. No numbness or weakness reported.     Review of Systems:  General ROS: negative for chills, fever or skin lesions;  No changes since last office visit  Cardiovascular ROS: no chest pain or dyspnea on exertion  Respiratory ROS: no cough, shortness of breath, or wheezing    Allergies:   Allergies   Allergen Reactions   • Penicillins Other (See Comments)     \"felt like I was dying\", could not move body,   • Bactrim [Sulfamethoxazole-Trimethoprim] Nausea And Vomiting   • Ciprofloxacin Rash   • Ibuprofen Rash       Home Meds:    No current facility-administered medications on file prior to encounter.      Current Outpatient Medications on File Prior to Encounter   Medication Sig Dispense Refill   • atorvastatin (LIPITOR) 40 MG tablet Take 40 mg by mouth Daily.     • escitalopram (LEXAPRO) 20 MG tablet Take 20 mg by mouth Daily.     • gabapentin (NEURONTIN) 300 MG capsule Take 300 mg by mouth 3 (Three) Times a Day.     • HYDROcodone-acetaminophen (NORCO)  MG per tablet take 1 tablet by mouth every 8 hours  0   • omeprazole (priLOSEC) 40 MG capsule Take 40 mg by mouth Daily.     • RA VITAMIN D-3 2000 UNITS capsule take 1 capsule by mouth once daily  0       PMH:   Past Medical History:   Diagnosis Date   • Acid reflux    • Anxiety    • Arthritis    • Bipolar disorder (CMS/HCC)    • Depression    • Diabetes mellitus (CMS/HCC)     type 2, diagnosed 2018, checks fsbg as needed,    • Hyperlipidemia    • Hypertension    • Neck pain    • JANNY (obstructive sleep apnea)     no cpap needed per pt report    • Pancreatitis    • Restless legs syndrome (RLS)    • Seizure (CMS/HCC)     20+ years ago, no meds    • Wears glasses      PSH:    Past Surgical History:   Procedure Laterality Date   • CERVICAL BIOPSY  W/ LOOP " ELECTRODE EXCISION      10/2000, 2011   •  SECTION     • CHOLECYSTECTOMY     • COLONOSCOPY  2018   • TUBAL ABDOMINAL LIGATION         Immunization History:  Influenza: 2018  Pneumococcal: denies  Tetanus: patient unsure     Social History:   Tobacco:   Social History     Tobacco Use   Smoking Status Current Every Day Smoker   • Packs/day: 1.00   • Years: 27.00   • Pack years: 27.00   • Types: Cigarettes   Smokeless Tobacco Never Used      Alcohol:     Social History     Substance and Sexual Activity   Alcohol Use Yes    Comment: rare       Vitals:           /72 (BP Location: Right arm, Patient Position: Lying)   Pulse 82   Temp 98 °F (36.7 °C) (Temporal)   Resp 18   SpO2 98%   Breastfeeding? No     Physical Exam:  General Appearance:    Alert, cooperative, no distress, appears stated age   Head:    Normocephalic, without obvious abnormality, atraumatic   Lungs:     Clear to auscultation bilaterally, respirations unlabored    Heart:   Regular rate and rhythm, S1 and S2 normal, no murmur, rub    or gallop    Abdomen:    Soft, non-tender.  +bowel sounds   Breast Exam:    deferred   Genitalia:    deferred   Extremities:   Extremities normal, atraumatic, no cyanosis or edema   Skin:   Skin color, texture, turgor normal, no rashes or lesions   Neurologic:   Grossly intact   Results Review  LABS:  Lab Results   Component Value Date    WBC 17.19 (H) 2019    HGB 15.0 2019    HCT 45.1 2019    MCV 83.7 2019     2019    GLUCOSE 145 (H) 2019    BUN 15 2019    CREATININE 0.68 2019    EGFRIFNONA 96 2019     2019    K 4.3 2019     2019    CO2 25.0 2019    CALCIUM 9.2 2019         Cancer Staging (if applicable)  Cancer Patient: __ yes _x_no __unknown; If yes, clinical stage T:__ N:__M:__, stage group or __N/A    Impression: Cervical disc herniation C6-7 right, right C7 radiculopathy; severe spinal stenosis  with spinal cord compression C5-6 and C6-7    Plan: cervical discectomy anterior with fusion C5-C6, C6-C7.    BRANDT Moreau   5/17/2019   6:48 AM

## 2019-05-17 NOTE — OP NOTE
OPERATIVE REPORT    DATE OF OPERATION: 5/17/2019    PREOPERATIVE DIAGNOSIS: Cervical spinal stenosis C5-6, C6-7, symptomatic cervical disc herniation right C6-7    POSTOPERATIVE DIAGNOSIS: Same    PROCEDURE PERFORMED: Anterior cervical discectomy and fusion C5-6, C6-7    SURGEON: hCang Lackey MD    ASSISTANT: Alyse Rivera PA-C    INDICATIONS: Patient had pain in the neck and right upper extremity.  MRI scan showed a cervical disc herniation chronic C6-7 on the right, with significant spinal stenosis at both C5-6 and C6-7.  Decompression and fusion at both levels from an anterior approach was selected.    DESCRIPTON OF PROCEDURE: Surgery was under general anesthesia in the supine position with the neck extended over a shoulder roll.  The anterior neck was prepared sterilely and draped.  A transverse skin incision was made in the anterior neck in a skin crease to the right of the midline.  The incision was deepened through the platysma and dissected medial to sternocleidomastoid and carotid sheath to the anterior cervical spine.  A needle was placed in the C 5-6 disc space and a lateral x-ray taken to confirm the disc space level, and because of high shoulder overriding the image the localization was difficult but the estimation of the C5-6 level was based on the resume with the vertebral bodies from the visualized C3-4 disc space.. The longus colli muscles were elevated at C5-6 and C6-7 and retracted with a TrimLine self-retaining retractor.    Using 4.5x Loupe magnification the C 6“7 disc space was incised with a #15 blade. [place] vertebral body distraction screws were placed and the disc space distracted. The anterior inferior osteophytic margin of the superior vertebral body was removed with a 3 mm Kerrison punch for visualization of the disc space.  The disc and cartilaginous endplate were removed with a pituitary rongeur and an angled cup curet. A high-speed drill was used to remove the remaining  cartilaginous endplate and to thin the posterior vertebral body osteophyte margin to the level of the posterior longitudinal ligament.  A 1 mm angled Kerrison punch was used to begin removal of the posterior longitudinal ligament and once the dura was clearly visualized a 2 mm Kerrison punch was used to complete removal of the remaining osteophyte and posterior longitudinal ligament, extending the decompression into the foramen on each side until the exiting nerve roots were visually confirmed to be free.  There was no free fragment, but the posterior osteophyte in the canal was thick as expected and removed with the Kerrison punch.  Gelfoam was placed over the foraminal epidural veins for hemostasis. A 6 mm height anatomic PEEK cage filled with [place] was inserted and countersunk by 1-2 mm. the [place] distraction screw was removed from the C 7 vertebral body.    The longus colli retractors were removed and replaced at the C 5-6 level. The disc space was incised with a #15 blade.The screw was placed in the C5 body and the disc space was distracted.  Using the same technique employed at the prior level, complete discectomy and cartilaginous endplate removal was performed along with removal of the posterior longitudinal ligament.  The dura and exiting nerve root sleeves were visualized bilaterally and all compression from disc and osteophyte confirmed to be removed  Gelfoam was placed over the foraminal epidural veins for hemostasis. A 5 mm height anatomic PEEK cage filled with [place] was inserted and countersunk by 1-2 mm.    A 37 mm Zevo plate was positioned over the disc space and fixed in position using two 15 mm screws in the upper vertebral body and two 13 mm screws in the middle andd lower vertebral body.  The locking mechanism was engaged.     The longus colli retractors were removed and the wound irrigated and thoroughly inspected for any bleeding points. Bipolar coagulation was used as necessary to achieve  final hemostasis. A lateral x-ray of the cervical spine not taken because of the inability to visualize the C5-6 and C6-7 level from the lateral view.  A 7 mm flat JESSICA drain was placed in the depth of the incision. The incision was then closed with a running Vicryl suture for the platysma and a running subcuticular Vicryl suture for the skin.  Glue was applied to the skin. The blood loss was 35 mL.    Chang Lackey M.D.

## 2019-05-17 NOTE — ANESTHESIA PREPROCEDURE EVALUATION
Anesthesia Evaluation     Patient summary reviewed   no history of anesthetic complications:  NPO Solid Status: > 8 hours  NPO Liquid Status: > 2 hours           Airway   Mallampati: III  TM distance: <3 FB  Neck ROM: full  Possible difficult intubation  Comment: Moving neck does not exacerbate symptoms  Dental    (+) poor dentition    Pulmonary - normal exam   (+) a smoker Current, sleep apnea,   Cardiovascular - normal exam  Exercise tolerance: good (4-7 METS)    ECG reviewed    (+) hypertension, hyperlipidemia,   (-) valvular problems/murmurs, dysrhythmias, angina, ZUNIGA      Neuro/Psych  (+) seizures (as child ), psychiatric history Anxiety, Depression and Bipolar,     GI/Hepatic/Renal/Endo    (+)  GERD well controlled,  diabetes mellitus well controlled,   (-) liver disease, no renal disease, hypothyroidism    Musculoskeletal     (+) neck pain, radiculopathy Right upper extremity  Abdominal   (+) obese,    Substance History      OB/GYN          Other   (+) arthritis                     Anesthesia Plan    ASA 3     general   (Glidescope)  intravenous induction   Anesthetic plan, all risks, benefits, and alternatives have been provided, discussed and informed consent has been obtained with: patient.  Use of blood products discussed with patient  Consented to blood products.   Plan discussed with CRNA.

## 2019-05-18 VITALS
HEIGHT: 66 IN | OXYGEN SATURATION: 96 % | RESPIRATION RATE: 16 BRPM | WEIGHT: 199.3 LBS | HEART RATE: 81 BPM | BODY MASS INDEX: 32.03 KG/M2 | TEMPERATURE: 98.4 F | SYSTOLIC BLOOD PRESSURE: 125 MMHG | DIASTOLIC BLOOD PRESSURE: 87 MMHG

## 2019-05-18 LAB — GLUCOSE BLDC GLUCOMTR-MCNC: 131 MG/DL (ref 70–130)

## 2019-05-18 PROCEDURE — 99024 POSTOP FOLLOW-UP VISIT: CPT | Performed by: NEUROLOGICAL SURGERY

## 2019-05-18 PROCEDURE — 82962 GLUCOSE BLOOD TEST: CPT

## 2019-05-18 RX ORDER — CETIRIZINE HYDROCHLORIDE 10 MG/1
10 TABLET ORAL DAILY
Status: DISCONTINUED | OUTPATIENT
Start: 2019-05-18 | End: 2019-05-18 | Stop reason: HOSPADM

## 2019-05-18 RX ORDER — OXYBUTYNIN CHLORIDE 5 MG/1
15 TABLET, EXTENDED RELEASE ORAL DAILY
Status: DISCONTINUED | OUTPATIENT
Start: 2019-05-18 | End: 2019-05-18 | Stop reason: HOSPADM

## 2019-05-18 RX ORDER — HYDROCODONE BITARTRATE AND ACETAMINOPHEN 10; 325 MG/1; MG/1
1 TABLET ORAL EVERY 6 HOURS PRN
Qty: 40 TABLET | Refills: 0 | Status: SHIPPED | OUTPATIENT
Start: 2019-05-18 | End: 2020-12-01 | Stop reason: SDUPTHER

## 2019-05-18 RX ORDER — GABAPENTIN 300 MG/1
300 CAPSULE ORAL 3 TIMES DAILY
Status: DISCONTINUED | OUTPATIENT
Start: 2019-05-18 | End: 2019-05-18 | Stop reason: HOSPADM

## 2019-05-18 RX ORDER — BUPROPION HYDROCHLORIDE 150 MG/1
150 TABLET, EXTENDED RELEASE ORAL EVERY 12 HOURS SCHEDULED
Status: DISCONTINUED | OUTPATIENT
Start: 2019-05-18 | End: 2019-05-18 | Stop reason: HOSPADM

## 2019-05-18 RX ORDER — OXYCODONE AND ACETAMINOPHEN 10; 325 MG/1; MG/1
1 TABLET ORAL EVERY 4 HOURS PRN
Status: DISCONTINUED | OUTPATIENT
Start: 2019-05-18 | End: 2019-05-18 | Stop reason: HOSPADM

## 2019-05-18 RX ADMIN — PANTOPRAZOLE SODIUM 40 MG: 40 TABLET, DELAYED RELEASE ORAL at 05:29

## 2019-05-18 RX ADMIN — OXYCODONE HYDROCHLORIDE AND ACETAMINOPHEN 1 TABLET: 10; 325 TABLET ORAL at 05:29

## 2019-05-18 RX ADMIN — GABAPENTIN 300 MG: 300 CAPSULE ORAL at 09:14

## 2019-05-18 RX ADMIN — BUPROPION HYDROCHLORIDE 150 MG: 150 TABLET, EXTENDED RELEASE ORAL at 09:14

## 2019-05-18 RX ADMIN — CETIRIZINE HYDROCHLORIDE 10 MG: 10 TABLET, FILM COATED ORAL at 09:14

## 2019-05-18 RX ADMIN — METFORMIN HYDROCHLORIDE 500 MG: 500 TABLET, FILM COATED ORAL at 09:14

## 2019-05-18 RX ADMIN — OXYBUTYNIN CHLORIDE 15 MG: 5 TABLET, EXTENDED RELEASE ORAL at 09:14

## 2019-05-18 NOTE — PLAN OF CARE
Problem: Patient Care Overview  Goal: Plan of Care Review   05/18/19 1864   Coping/Psychosocial   Plan of Care Reviewed With patient   Plan of Care Review   Progress improving   OTHER   Outcome Summary alert and oriented acd with minimal c/o pain. dressing clean and dry. ambulating with assist of one without c/o. denies numbness or tingling. vss

## 2019-05-18 NOTE — DISCHARGE SUMMARY
DISCHARGE SUMMARY    Date of Admission: 5/17/2019    Date of Discharge:  5/18/2019    Discharge Diagnosis: Cervical disc herniation C6-7 right, spinal stenosis C5-6 and C6-7.    Procedures Performed:  Procedure(s):  CERVICAL DISCECTOMY ANTERIOR WITH FUSION C5-6 AND C6-7    Referring Physician: Bonifacio Castillo MD.    Presenting Problem/History of Present Illness  Cervical stenosis of spinal canal [M48.02]  Herniation of cervical intervertebral disc [M50.20]  Cervical disc herniation [M50.20]  Encounter for gynecological examination without abnormal finding [Z01.419]     Hospital Course  Patient is a 38 y.o. female who presented with symptoms of pain in the neck and right shoulder and arm.  Symptoms were treated over many years with physical therapy but became more constant in the right neck shoulder and arm for several months without relief despite physical therapy or medications.  Cervical MRI scan showed a cervical disc herniation on the right at C6-7 with significant canal stenosis as well as foraminal stenosis and displacement of the spinal cord on the right side.  A moderately severe stenosis was present also at C5-6 with advanced degenerative change.    On the day of admission anterior cervical discectomy and fusion at C5-6 and C6-7 was performed.  There are no surgical complications noted.  Drain was left in the wound overnight and removed the following morning after draining 40 cc.  She was able to ambulate, swallow appropriately, void, and vocalize without problems.  She remained neurologically intact.  There was no significant incisional swelling at the time of discharge.    The patient was discharged home the morning after surgery.  Discharge medication Norco 10 for pain, as was used preoperatively.  Wound closure was subcuticular with skin glue.  Follow-up will be in 4 weeks.    Discharge Medications     Discharge Medications      Changes to Medications      Instructions Start Date    HYDROcodone-acetaminophen  MG per tablet  Commonly known as:  KAILEY  What changed:  Another medication with the same name was added. Make sure you understand how and when to take each.   take 1 tablet by mouth every 8 hours      HYDROcodone-acetaminophen  MG per tablet  Commonly known as:  KAILEY  What changed:  You were already taking a medication with the same name, and this prescription was added. Make sure you understand how and when to take each.   1 tablet, Oral, Every 6 Hours PRN         Continue These Medications      Instructions Start Date   atorvastatin 40 MG tablet  Commonly known as:  LIPITOR   40 mg, Oral, Daily      buPROPion  MG 12 hr tablet  Commonly known as:  WELLBUTRIN SR   150 mg, Oral, 2 Times Daily      cetirizine 10 MG tablet  Commonly known as:  zyrTEC   10 mg, Oral, Daily      escitalopram 20 MG tablet  Commonly known as:  LEXAPRO   20 mg, Oral, Daily      fenofibrate 160 MG tablet   160 mg, Oral, Daily      gabapentin 300 MG capsule  Commonly known as:  NEURONTIN   300 mg, Oral, 3 Times Daily      JARDIANCE 25 MG tablet  Generic drug:  Empagliflozin   25 mg, Oral, Daily      losartan 100 MG tablet  Commonly known as:  COZAAR   100 mg, Oral, Daily      metFORMIN 500 MG tablet  Commonly known as:  GLUCOPHAGE   500 mg, Oral, 2 Times Daily With Meals      omeprazole 40 MG capsule  Commonly known as:  priLOSEC   40 mg, Oral, Daily      oxybutynin XL 15 MG 24 hr tablet  Commonly known as:  DITROPAN XL   15 mg, Oral, Daily      RA VITAMIN D-3 2000 units capsule  Generic drug:  Cholecalciferol   take 1 capsule by mouth once daily      REQUIP 3 MG tablet  Generic drug:  rOPINIRole   3 mg, Oral, Nightly      RISPERDAL 4 MG tablet  Generic drug:  risperiDONE   4 mg, Oral, Daily      VICTOZA SC   1.8 mg, Subcutaneous, Daily             Chang Lackey MD  05/18/19  7:26 AM

## 2019-05-18 NOTE — PLAN OF CARE
Problem: Patient Care Overview  Goal: Plan of Care Review  Outcome: Outcome(s) achieved Date Met: 05/18/19 05/18/19 1113   Coping/Psychosocial   Plan of Care Reviewed With patient;spouse;family   Plan of Care Review   Progress improving   OTHER   Outcome Summary patient has well controlled pain; denies numbness and tingling; yady is a current smoker, educated on importace of quitting for wound healing and DVT prophylaxis, patient denies desire to quit; discharge packet reviewed with patient no questions at this time, refused lortab prescription stating she has a doctor back home that prescribes her pain medication.       Problem: Laminectomy/Foraminotomy/Discectomy (Adult)  Goal: Signs and Symptoms of Listed Potential Problems Will be Absent, Minimized or Managed (Laminectomy/Foraminotomy/Discectomy)  Outcome: Outcome(s) achieved Date Met: 05/18/19    Goal: Anesthesia/Sedation Recovery  Outcome: Outcome(s) achieved Date Met: 05/18/19

## 2019-06-18 ENCOUNTER — OFFICE VISIT (OUTPATIENT)
Dept: NEUROSURGERY | Facility: CLINIC | Age: 39
End: 2019-06-18

## 2019-06-18 VITALS — WEIGHT: 199 LBS | BODY MASS INDEX: 31.98 KG/M2 | HEIGHT: 66 IN

## 2019-06-18 DIAGNOSIS — Z98.1 STATUS POST CERVICAL SPINAL FUSION: Primary | ICD-10-CM

## 2019-06-18 PROBLEM — M50.20 HERNIATION OF CERVICAL INTERVERTEBRAL DISC: Status: RESOLVED | Noted: 2019-04-02 | Resolved: 2019-06-18

## 2019-06-18 PROBLEM — M48.02 CERVICAL STENOSIS OF SPINAL CANAL: Status: RESOLVED | Noted: 2019-04-02 | Resolved: 2019-06-18

## 2019-06-18 PROBLEM — M50.20 CERVICAL DISC HERNIATION: Status: RESOLVED | Noted: 2019-05-17 | Resolved: 2019-06-18

## 2019-06-18 PROCEDURE — 99024 POSTOP FOLLOW-UP VISIT: CPT | Performed by: NEUROLOGICAL SURGERY

## 2019-06-18 RX ORDER — BUPROPION HYDROCHLORIDE 150 MG/1
150 TABLET ORAL 2 TIMES DAILY
Refills: 0 | COMMUNITY
Start: 2019-05-29

## 2019-06-18 NOTE — PROGRESS NOTES
Subjective   Kim Ray is a 38 y.o. female who presents for follow up of ACDF C5-6, C6-7.     The patient had cervical spinal stenosis at 2 levels, C5-6 and C6-7, with a symptomatic cervical disc herniation on the right side with right C7 radiculopathy.  Anterior cervical discectomy and fusion at both levels was performed 1 month ago on 5/17/2019.  She has had an uneventful recovery from her surgery and is doing quite well, with relief of the right cervical radiculopathy, and minimal neck pain complaints.  She takes pain medication chronically for her lower back.    The following portions of the patient's history were reviewed, updated as appropriate and approved: allergies, current medications, past family history, past medical history, past social history, past surgical history, review of systems and problem list.     Review of Systems   Constitutional: Negative for activity change, appetite change, chills, diaphoresis, fatigue, fever and unexpected weight change.   HENT: Negative for congestion, dental problem, drooling, ear discharge, ear pain, facial swelling, hearing loss, mouth sores, nosebleeds, postnasal drip, rhinorrhea, sinus pressure, sneezing, sore throat, tinnitus, trouble swallowing and voice change.    Eyes: Negative for photophobia, pain, discharge, redness, itching and visual disturbance.   Respiratory: Negative for apnea, cough, choking, chest tightness, shortness of breath, wheezing and stridor.    Cardiovascular: Negative for chest pain, palpitations and leg swelling.   Gastrointestinal: Negative for abdominal distention, abdominal pain, anal bleeding, blood in stool, constipation, diarrhea, nausea, rectal pain and vomiting.   Endocrine: Negative for cold intolerance, heat intolerance, polydipsia, polyphagia and polyuria.   Genitourinary: Negative for decreased urine volume, difficulty urinating, dysuria, enuresis, flank pain, frequency, genital sores, hematuria and urgency.    Musculoskeletal: Positive for arthralgias, back pain and neck pain. Negative for gait problem, joint swelling, myalgias and neck stiffness.   Skin: Negative for color change, pallor, rash and wound.   Allergic/Immunologic: Negative for environmental allergies, food allergies and immunocompromised state.   Neurological: Positive for weakness and headaches. Negative for dizziness, tremors, seizures, syncope, facial asymmetry, speech difficulty, light-headedness and numbness.   Hematological: Negative for adenopathy. Does not bruise/bleed easily.   Psychiatric/Behavioral: Negative for agitation, behavioral problems, confusion, decreased concentration, dysphoric mood, hallucinations, self-injury, sleep disturbance and suicidal ideas. The patient is not nervous/anxious and is not hyperactive.        Objective    NEUROLOGICAL EXAMINATION:    Anterior cervical incision is healing well.    Assessment   1 month postop ACDF C5-6 and C6-7.  Doing well with no surgical complications.       Plan   Follow-up in 2 months with x-ray of the cervical spine.       Chang Lackey MD

## 2019-08-22 ENCOUNTER — OFFICE VISIT (OUTPATIENT)
Dept: NEUROSURGERY | Facility: CLINIC | Age: 39
End: 2019-08-22

## 2019-08-22 VITALS — WEIGHT: 199 LBS | BODY MASS INDEX: 31.98 KG/M2 | HEIGHT: 66 IN

## 2019-08-22 DIAGNOSIS — Z98.1 STATUS POST CERVICAL SPINAL FUSION: Primary | ICD-10-CM

## 2019-08-22 PROCEDURE — 99213 OFFICE O/P EST LOW 20 MIN: CPT | Performed by: NEUROLOGICAL SURGERY

## 2019-08-22 NOTE — PROGRESS NOTES
Subjective   Kim Ray is a 38 y.o. female who presents for follow up of ACDF C5-6, C6-7..     The patient had a 2 level ACDF at C5-6 and C6-7 for asymptomatic right sided C7 radiculopathy and advanced degenerative disc at C5-6 with significant spinal canal stenosis at both C5-6 and C6-7.  Postoperatively she has relief of the right side of radiculopathy but still experiences posterior neck and interscapular pain.    Postoperative cervical spine x-rays were done on 8/16/2019, last week, and show good position of the interbody grafts, plate, and screws.  It is of course too early to see any ossification within the disc space from the fusion.    The following portions of the patient's history were reviewed, updated as appropriate and approved: allergies, current medications, past family history, past medical history, past social history, past surgical history, review of systems and problem list.     Review of Systems   Constitutional: Negative for activity change, appetite change, chills, diaphoresis, fatigue, fever and unexpected weight change.   HENT: Negative for congestion, dental problem, drooling, ear discharge, ear pain, facial swelling, hearing loss, mouth sores, nosebleeds, postnasal drip, rhinorrhea, sinus pressure, sneezing, sore throat, tinnitus, trouble swallowing and voice change.    Eyes: Negative for photophobia, pain, discharge, redness, itching and visual disturbance.   Respiratory: Negative for apnea, cough, choking, chest tightness, shortness of breath, wheezing and stridor.    Cardiovascular: Negative for chest pain, palpitations and leg swelling.   Gastrointestinal: Negative for abdominal distention, abdominal pain, anal bleeding, blood in stool, constipation, diarrhea, nausea, rectal pain and vomiting.   Endocrine: Negative for cold intolerance, heat intolerance, polydipsia, polyphagia and polyuria.   Genitourinary: Negative for decreased urine volume, difficulty urinating, dysuria,  enuresis, flank pain, frequency, genital sores, hematuria and urgency.   Musculoskeletal: Positive for arthralgias, back pain and neck pain. Negative for gait problem, joint swelling, myalgias and neck stiffness.   Skin: Negative for color change, pallor, rash and wound.   Allergic/Immunologic: Negative for environmental allergies, food allergies and immunocompromised state.   Neurological: Positive for weakness and headaches. Negative for dizziness, tremors, seizures, syncope, facial asymmetry, speech difficulty, light-headedness and numbness.   Hematological: Negative for adenopathy. Does not bruise/bleed easily.   Psychiatric/Behavioral: Negative for agitation, behavioral problems, confusion, decreased concentration, dysphoric mood, hallucinations, self-injury, sleep disturbance and suicidal ideas. The patient is not nervous/anxious and is not hyperactive.        Objective    NEUROLOGICAL EXAMINATION:    Anterior cervical incision well-healed.  Vocalization intact.  Neurologically intact.    Assessment   3 months postop ACDF C5-6, C6-7, residual significant postoperative neck pain, either related to the early stage of fusion which is incomplete at this point, or adjacent level degenerative changes at C3-4 and C3-4 evident on the lateral x-ray.       Plan   Follow-up next spring, 2020, if neck pain persists, at which time CT scan could be done to determine if the fusion of the 2 disc spaces developed properly, or whether pseudoarthrosis had occurred.       Chang Lackey MD

## 2020-12-01 ENCOUNTER — OFFICE VISIT (OUTPATIENT)
Dept: NEUROSURGERY | Facility: CLINIC | Age: 40
End: 2020-12-01

## 2020-12-01 VITALS
WEIGHT: 193 LBS | BODY MASS INDEX: 30.29 KG/M2 | SYSTOLIC BLOOD PRESSURE: 130 MMHG | DIASTOLIC BLOOD PRESSURE: 92 MMHG | TEMPERATURE: 98.6 F | HEIGHT: 67 IN

## 2020-12-01 DIAGNOSIS — M50.30 DEGENERATIVE DISC DISEASE, CERVICAL: Primary | ICD-10-CM

## 2020-12-01 DIAGNOSIS — M96.1 POST LAMINECTOMY SYNDROME: ICD-10-CM

## 2020-12-01 PROCEDURE — 99213 OFFICE O/P EST LOW 20 MIN: CPT | Performed by: NEUROLOGICAL SURGERY

## 2020-12-01 RX ORDER — METHOCARBAMOL 750 MG/1
750 TABLET, FILM COATED ORAL NIGHTLY
Qty: 30 TABLET | Refills: 0 | Status: SHIPPED | OUTPATIENT
Start: 2020-12-01 | End: 2020-12-28

## 2020-12-01 RX ORDER — FAMOTIDINE 20 MG
1000 TABLET ORAL DAILY
COMMUNITY

## 2020-12-01 RX ORDER — DULOXETIN HYDROCHLORIDE 30 MG/1
30 CAPSULE, DELAYED RELEASE ORAL DAILY
Qty: 30 CAPSULE | Refills: 1 | Status: SHIPPED | OUTPATIENT
Start: 2020-12-01 | End: 2021-02-02

## 2020-12-01 NOTE — PATIENT INSTRUCTIONS
Call Dr. Guzman on a Monday or Tuesday (ask for Trixie or Dinorah) and leave a message.     Dr. Guzman will call you back at the end of the day as soon as he can.     [phone] Trixie    [phone] Dinorah [place]    Call prn

## 2020-12-01 NOTE — PROGRESS NOTES
Kim Ray  1980  [id number]      Chief Complaint   Patient presents with   • Neck Pain       HISTORY OF PRESENT ILLNESS: This is a 40-year-old female who previously had anterior discectomy and fusion C5-C7 and has continued to have severe pain in her neck.  She also has pain in the lumbar region.  Pain is primarily axial and nonradicular.  I have unable to assess elicit a history that would suggest nerve root or spinal cord compression.  She has no signs and or symptoms of a myelopathy.  Cervical MRI was repeated and she is referred for neurosurgical reevaluation.    Past Medical History:   Diagnosis Date   • Acid reflux    • Anxiety    • Arthritis    • Bipolar disorder (CMS/HCC)    • Depression    • Diabetes mellitus (CMS/HCC)     type 2, diagnosed 2018, checks fsbg as needed,    • Headache    • Hyperlipidemia    • Hypertension    • Low back pain    • Neck pain    • JANNY (obstructive sleep apnea)     no cpap needed per pt report    • Pancreatitis    • Restless legs syndrome (RLS)    • Seizure (CMS/HCC)     20+ years ago, no meds    • Wears glasses        Past Surgical History:   Procedure Laterality Date   • ANTERIOR CERVICAL DISCECTOMY W/ FUSION N/A 2019    Procedure: CERVICAL DISCECTOMY ANTERIOR WITH FUSION C5-6 AND C6-7;  Surgeon: Chang Lackey MD;  Location: [facility];  Service: Neurosurgery   • CERVICAL BIOPSY  W/ LOOP ELECTRODE EXCISION      10/2000, 2011   •  SECTION     • CHOLECYSTECTOMY     • COLONOSCOPY  2018   • TUBAL ABDOMINAL LIGATION         Family History   Problem Relation Age of Onset   • Diabetes Father    • Hyperlipidemia Mother    • Hypertension Mother    • Thyroid disease Sister    • Diabetes Paternal Grandmother    • Skin cancer Maternal Grandfather        Social History     Socioeconomic History   • Marital status:      Spouse name: Not on file   • Number of children: Not on file   • Years of education: Not on file   • Highest education level: Not on  "file   Tobacco Use   • Smoking status: Current Every Day Smoker     Packs/day: 1.00     Years: 27.00     Pack years: 27.00     Types: Cigarettes   • Smokeless tobacco: Never Used   Substance and Sexual Activity   • Alcohol use: Yes     Comment: rare   • Drug use: No   • Sexual activity: Defer     Birth control/protection: I.U.D.       Allergies   Allergen Reactions   • Penicillins Other (See Comments)     \"felt like I was dying\", could not move body,   • Sulfa Antibiotics Nausea Only   • Bactrim [Sulfamethoxazole-Trimethoprim] Nausea And Vomiting   • Ciprofloxacin Rash   • Ibuprofen Rash         Current Outpatient Medications:   •  atorvastatin (LIPITOR) 40 MG tablet, Take 40 mg by mouth Daily., Disp: , Rfl:   •  buPROPion XL (WELLBUTRIN XL) 150 MG 24 hr tablet, Take 150 mg by mouth 2 (Two) Times a Day., Disp: , Rfl: 0  •  cetirizine (zyrTEC) 10 MG tablet, Take 10 mg by mouth Daily., Disp: , Rfl:   •  Empagliflozin (JARDIANCE) 25 MG tablet, Take 25 mg by mouth Daily., Disp: , Rfl:   •  escitalopram (LEXAPRO) 20 MG tablet, Take 20 mg by mouth Daily., Disp: , Rfl:   •  fenofibrate 160 MG tablet, Take 160 mg by mouth Daily., Disp: , Rfl:   •  gabapentin (NEURONTIN) 300 MG capsule, Take 300 mg by mouth 3 (Three) Times a Day., Disp: , Rfl:   •  HYDROcodone-acetaminophen (NORCO)  MG per tablet, take 1 tablet by mouth every 8 hours, Disp: , Rfl: 0  •  losartan (COZAAR) 100 MG tablet, Take 100 mg by mouth Daily., Disp: , Rfl:   •  metFORMIN (GLUCOPHAGE) 500 MG tablet, Take 500 mg by mouth 2 (Two) Times a Day With Meals., Disp: , Rfl:   •  omeprazole (priLOSEC) 40 MG capsule, Take 40 mg by mouth Daily., Disp: , Rfl:   •  oxybutynin XL (DITROPAN XL) 15 MG 24 hr tablet, Take 15 mg by mouth Daily., Disp: , Rfl:   •  risperiDONE (RISPERDAL) 4 MG tablet, Take 4 mg by mouth Daily., Disp: , Rfl:   •  rOPINIRole (REQUIP) 3 MG tablet, Take 3 mg by mouth Every Night., Disp: , Rfl:   •  Vitamin D, Cholecalciferol, 25 MCG (1000 " UT) capsule, Take 1,000 Units by mouth Daily., Disp: , Rfl:   •  Liraglutide (VICTOZA SC), Inject 1.8 mg under the skin into the appropriate area as directed Daily., Disp: , Rfl:     Current Facility-Administered Medications:   •  levonorgestrel (MIRENA) 20 MCG/24HR IUD 1 each, 1 each, Intrauterine, Once, Janette Coleman PA-C    Facility-Administered Medications Ordered in Other Visits:   •  mupirocin (BACTROBAN) 2 % nasal ointment, , Nasal, BID, Марина Tran PAMatildaC    Review of Systems   Constitutional: Positive for fatigue. Negative for activity change, appetite change, chills, diaphoresis, fever and unexpected weight change.   HENT: Negative for congestion, dental problem, drooling, ear discharge, ear pain, facial swelling, hearing loss, mouth sores, nosebleeds, postnasal drip, rhinorrhea, sinus pressure, sneezing, sore throat, tinnitus, trouble swallowing and voice change.    Eyes: Positive for itching. Negative for photophobia, pain, discharge, redness and visual disturbance.   Respiratory: Negative for apnea, cough, choking, chest tightness, shortness of breath, wheezing and stridor.    Cardiovascular: Negative for chest pain, palpitations and leg swelling.   Gastrointestinal: Negative for abdominal distention, abdominal pain, anal bleeding, blood in stool, constipation, diarrhea, nausea, rectal pain and vomiting.   Endocrine: Negative for cold intolerance, heat intolerance, polydipsia, polyphagia and polyuria.   Genitourinary: Negative for decreased urine volume, difficulty urinating, dysuria, enuresis, flank pain, frequency, genital sores, hematuria and urgency.   Musculoskeletal: Positive for back pain and neck pain. Negative for arthralgias, gait problem, joint swelling, myalgias and neck stiffness.   Skin: Negative for color change, pallor, rash and wound.   Allergic/Immunologic: Positive for environmental allergies. Negative for food allergies and immunocompromised state.   Neurological: Positive  "for headaches. Negative for dizziness, tremors, seizures, syncope, facial asymmetry, speech difficulty, weakness, light-headedness and numbness.   Hematological: Negative for adenopathy. Does not bruise/bleed easily.   Psychiatric/Behavioral: Negative for agitation, behavioral problems, confusion, decreased concentration, dysphoric mood, hallucinations, self-injury, sleep disturbance and suicidal ideas. The patient is nervous/anxious. The patient is not hyperactive.    All other systems reviewed and are negative.      Vitals:    12/01/20 1241   BP: 130/92   BP Location: Left arm   Patient Position: Sitting   Cuff Size: Adult   Temp: 98.6 °F (37 °C)   TempSrc: Infrared   Weight: 87.5 kg (193 lb)   Height: 170.2 cm (67\")       Neurological Examination:  Mental status/speech: The patient is alert and oriented.  Speech is clear without aphysia or dysarthria.  No overt cognitive deficits.    Cranial nerve examination:    Olfaction: Smell is intact.  Vision: Vision is intact without visual field abnormalities.  Funduscopic examination is normal.  No pupillary irregularity.  Ocular motor examination: The extraocular muscles are intact.  There is no diplopia.  The pupil is round and reactive to both light and accommodation.  There is no nystagmus.  Facial movement/sensation: There is no facial weakness.  Sensation is intact in the first, second, and third divisions of the trigeminal nerve.  The corneal reflex is intact.  Auditory: Hearing is intact to finger rub bilaterally.  Cranial nerves IX, X, XI, XII: Phonation is normal.  No dysphagia.  Tongue is protruded in the midline without atrophy.  The gag reflex is intact.  Shoulder shrug is normal.    Musculoligamentous ligamentous examination: BMI 30.2; weight 193 pounds.  She has marked limitation in the range of motion of the cervical and lumbar area.  Her strength is intact without sensory loss.  Deep tendon reflexes are hypoactive.  There is no Babinski, Maryanne or " clonus.  Her gait is normal.            Medical Decision Making:     Diagnostic Data Set: Cervical MRI shows postsurgical changes with spur formation throughout.  She has severe degenerative osteoarthritis however does not have high-grade spinal or lateral recess stenosis.      Assessment: Status post cervical fusion C5-7; advanced degenerative osteoarthritis          Recommendations: Unfortunately surgery will not provide a remedy.  She showed very little improvement subsequent to her initial surgery.  I have given her a prescription of Cymbalta 30 mg daily and Robaxin 750 mg at night.  She is intolerant to NSAIDs furthermore has diabetes which makes that therapeutic approach quite problematic.  Unfortunately I really not sure what else can be done to help her other than medicines that she is currently taking.  I have made a referral to the [facility] service for their input and recommendations.  She will call me on as-needed basis.      I greatly appreciate the opportunity to see and evaluate this individual.  If you have questions or concerns regarding issues that I may have overlooked please call me at any time: [phone].  Ming Guzman M.D.  [facility]  [facility] .  [facility]  [id number]

## 2020-12-28 RX ORDER — METHOCARBAMOL 750 MG/1
TABLET, FILM COATED ORAL
Qty: 30 TABLET | Refills: 0 | Status: SHIPPED | OUTPATIENT
Start: 2020-12-28 | End: 2021-01-06

## 2021-01-06 ENCOUNTER — OFFICE VISIT (OUTPATIENT)
Dept: OBSTETRICS AND GYNECOLOGY | Facility: CLINIC | Age: 41
End: 2021-01-06

## 2021-01-06 VITALS
SYSTOLIC BLOOD PRESSURE: 126 MMHG | BODY MASS INDEX: 31.08 KG/M2 | WEIGHT: 198 LBS | HEIGHT: 67 IN | DIASTOLIC BLOOD PRESSURE: 78 MMHG

## 2021-01-06 DIAGNOSIS — E11.69 TYPE 2 DIABETES MELLITUS WITH OTHER SPECIFIED COMPLICATION, WITHOUT LONG-TERM CURRENT USE OF INSULIN (HCC): ICD-10-CM

## 2021-01-06 DIAGNOSIS — N39.46 URINARY INCONTINENCE, MIXED: Primary | ICD-10-CM

## 2021-01-06 DIAGNOSIS — N36.42 INTRINSIC SPHINCTER DEFICIENCY (ISD): ICD-10-CM

## 2021-01-06 PROCEDURE — 99203 OFFICE O/P NEW LOW 30 MIN: CPT | Performed by: OBSTETRICS & GYNECOLOGY

## 2021-01-07 LAB — HBA1C MFR BLD: 8.5 % (ref 4.8–5.6)

## 2021-01-07 NOTE — PROGRESS NOTES
GYN Office Visit    Subjective   Chief Complaint   Patient presents with   • Bladder Prolapse     Referred from PCP for prolapse, stress incontinence      Kim Ray is a 40 y.o. year old  presenting to be seen for urinary incontinence.    She has significant urinary incontinence.  She leaks urine multiple times every day.  She leaks urine with coughing/sneezing/laughing.  She also leaks urine with urge symptoms.  She voids frequently throughout the day and voids 1 time at night.  She has taken Ditropan for roughly 3 years with no improvement.  Minimal side effects.  Her PCP noted mild prolapse during her recent exam.    She has type 2 diabetes, no insulin.  Recent A1c was greater than 8%.    Previous tubal ligation.  Mirena IUD in place, amenorrheic with this device.    OB Hx: 1 , 1 vaginal birth  Pap smear: 2017  Mammogram: never  Colonoscopy: never  DEXA Scan: never    Past Medical History:   Diagnosis Date   • Acid reflux    • Anxiety    • Arthritis    • Bipolar disorder (CMS/[facility])    • Depression    • Diabetes mellitus (CMS/HCC)     type 2, diagnosed , checks fsbg as needed,    • Headache    • Hyperlipidemia    • Hypertension    • Low back pain    • Neck pain    • JANNY (obstructive sleep apnea)     no cpap needed per pt report    • Pancreatitis    • Restless legs syndrome (RLS)    • Seizure (CMS/HCC)     20+ years ago, no meds    • Wears glasses        Past Surgical History:   Procedure Laterality Date   • ANTERIOR CERVICAL DISCECTOMY W/ FUSION N/A 2019    Procedure: CERVICAL DISCECTOMY ANTERIOR WITH FUSION C5-6 AND C6-7;  Surgeon: Chang Lackey MD;  Location: [facility];  Service: Neurosurgery   • CERVICAL BIOPSY  W/ LOOP ELECTRODE EXCISION      10/2000, 2011   •  SECTION     • CHOLECYSTECTOMY     • COLONOSCOPY  2018   • TUBAL ABDOMINAL LIGATION         Family History   Problem Relation Age of Onset   • Diabetes Father    • Hyperlipidemia Mother    • Hypertension  Mother    • Thyroid disease Sister    • Diabetes Paternal Grandmother    • Skin cancer Maternal Grandfather         Social History     Tobacco Use   • Smoking status: Current Every Day Smoker     Packs/day: 1.00     Years: 27.00     Pack years: 27.00     Types: Cigarettes   • Smokeless tobacco: Never Used   Substance Use Topics   • Alcohol use: Yes     Comment: rare   • Drug use: No       (Not in a hospital admission)      Penicillins, Sulfa antibiotics, Bactrim [sulfamethoxazole-trimethoprim], Ciprofloxacin, and Ibuprofen    Current Outpatient Medications on File Prior to Visit   Medication Sig Dispense Refill   • atorvastatin (LIPITOR) 40 MG tablet Take 40 mg by mouth Daily.     • buPROPion XL (WELLBUTRIN XL) 150 MG 24 hr tablet Take 150 mg by mouth 2 (Two) Times a Day.  0   • cetirizine (zyrTEC) 10 MG tablet Take 10 mg by mouth Daily.     • DULoxetine (CYMBALTA) 30 MG capsule Take 1 capsule by mouth Daily. 30 capsule 1   • Empagliflozin (JARDIANCE) 25 MG tablet Take 25 mg by mouth Daily.     • escitalopram (LEXAPRO) 20 MG tablet Take 20 mg by mouth Daily.     • fenofibrate 160 MG tablet Take 160 mg by mouth Daily.     • gabapentin (NEURONTIN) 300 MG capsule Take 300 mg by mouth 3 (Three) Times a Day.     • HYDROcodone-acetaminophen (NORCO)  MG per tablet take 1 tablet by mouth every 8 hours  0   • Liraglutide (VICTOZA SC) Inject 1.8 mg under the skin into the appropriate area as directed Daily.     • losartan (COZAAR) 100 MG tablet Take 100 mg by mouth Daily.     • metFORMIN (GLUCOPHAGE) 500 MG tablet Take 500 mg by mouth 2 (Two) Times a Day With Meals.     • omeprazole (priLOSEC) 40 MG capsule Take 40 mg by mouth Daily.     • risperiDONE (RISPERDAL) 4 MG tablet Take 4 mg by mouth Daily.     • rOPINIRole (REQUIP) 3 MG tablet Take 3 mg by mouth Every Night.     • Vitamin D, Cholecalciferol, 25 MCG (1000 UT) capsule Take 1,000 Units by mouth Daily.       Current Facility-Administered Medications on File Prior  "to Visit   Medication Dose Route Frequency Provider Last Rate Last Admin   • levonorgestrel (MIRENA) 20 MCG/24HR IUD 1 each  1 each Intrauterine Once Janette Coleman PA-C       • mupirocin (BACTROBAN) 2 % nasal ointment   Nasal BID Марина Tran PA-C           Social History    Tobacco Use      Smoking status: Current Every Day Smoker        Packs/day: 1.00        Years: 27.00        Pack years: 27        Types: Cigarettes      Smokeless tobacco: Never Used         Objective   /78   Ht 170.2 cm (67\")   Wt 89.8 kg (198 lb)   BMI 31.01 kg/m²     Physical Exam:  General Appearance: alert, pleasant, appears stated age, interactive and cooperative  Breasts: Not performed.  Abdomen: no masses, no hepatomegaly, no splenomegaly, soft non-tender, no guarding and no rebound tenderness  Pelvis:  Pelvic: Clinical staff was present for exam  External genitalia:  normal appearance of the external genitalia including Bartholin's and [place]'s glands.  :  urethral meatus normal; urethra hypermobile; positive supine cough stress test with an empty bladder.  Large, forceful leakage event.  Vagina:  normal pink mucosa without prolapse or lesions.  Cervix:  normal appearance.  IUD strings visible.  Uterus:  normal size, shape and consistency.  Adnexa:  normal bimanual exam of the adnexa.  Rectal:  digital rectal exam not performed; anus visually normal appearing.  Cystocele GRADE 1  Rectocele GRADE 0  Uterine GRADE 0         Medical Decision Making:    I reviewed her most recent GYN office notes    Assessment   Mixed urinary incontinence with stress predominance  Concern for intrinsic sphincter deficiency  Poorly controlled type 2 diabetes     Plan    Orders Placed This Encounter   Procedures   • Hemoglobin A1c     Order Specific Question:   [facility] Has the patient fasted?     Answer:   No   • Ambulatory Referral to Urology     Referral Priority:   Routine     Referral Type:   Consultation     Referral Reason:   " Specialty Services Required     Referred to Provider:   Vishal Mercedes MD     Requested Specialty:   Urology     Number of Visits Requested:   1       Medication Management: None    Procedures Performed: None    We reviewed her symptoms and exam findings in detail today.  She has significant stress incontinence and symptoms of urgency incontinence.  Given her cough stress test results, I am concerned for intrinsic sphincter deficiency.  Our office will coordinate a referral with urology for definitive management.  We did discuss sling procedures in detail today.  We also discussed her diabetes and glucose control.  Repeat a hemoglobin A1c today.  That number will need to be < 7% for any procedure.    RTC for annual visits.    Sivakumar Tapia MD  Obstetrics and Gynecology  [facility]

## 2021-01-26 DIAGNOSIS — M96.1 POST LAMINECTOMY SYNDROME: ICD-10-CM

## 2021-01-26 DIAGNOSIS — M48.02 CERVICAL STENOSIS OF SPINAL CANAL: ICD-10-CM

## 2021-01-26 DIAGNOSIS — M50.30 DEGENERATIVE DISC DISEASE, CERVICAL: Primary | ICD-10-CM

## 2021-01-26 DIAGNOSIS — Z98.1 STATUS POST CERVICAL SPINAL FUSION: ICD-10-CM

## 2021-01-26 DIAGNOSIS — M50.20 HERNIATION OF CERVICAL INTERVERTEBRAL DISC: ICD-10-CM

## 2021-01-26 RX ORDER — METHOCARBAMOL 750 MG/1
750 TABLET, FILM COATED ORAL NIGHTLY
Qty: 30 TABLET | Refills: 0 | Status: SHIPPED | OUTPATIENT
Start: 2021-01-26 | End: 2021-02-25

## 2021-01-26 NOTE — TELEPHONE ENCOUNTER
Provider:  Miguel CARLTON  Caller: fax  Time of call:  12:07   Phone #:  [phone]  Surgery:  Cervical discectomy  Surgery Date:  05/17/19  Last visit:   12/01/20  Next visit:     JORGE:         Reason for call:     Patient requests refill on methocarbamol 750 mg., 1 po QHS.

## 2021-02-02 DIAGNOSIS — M50.20 HERNIATION OF CERVICAL INTERVERTEBRAL DISC: ICD-10-CM

## 2021-02-02 DIAGNOSIS — M50.30 DEGENERATIVE DISC DISEASE, CERVICAL: Primary | ICD-10-CM

## 2021-02-02 DIAGNOSIS — Z98.1 STATUS POST CERVICAL SPINAL FUSION: ICD-10-CM

## 2021-02-02 DIAGNOSIS — M96.1 POST LAMINECTOMY SYNDROME: ICD-10-CM

## 2021-02-02 DIAGNOSIS — M48.02 CERVICAL STENOSIS OF SPINAL CANAL: ICD-10-CM

## 2021-02-02 RX ORDER — DULOXETIN HYDROCHLORIDE 30 MG/1
30 CAPSULE, DELAYED RELEASE ORAL DAILY
Qty: 60 CAPSULE | Refills: 0 | Status: SHIPPED | OUTPATIENT
Start: 2021-02-02 | End: 2021-03-11

## 2021-02-02 NOTE — TELEPHONE ENCOUNTER
Tell the patient we will refill this medication and ask her if she thinks here PCP would continue meds for her. DOUG.

## 2021-02-02 NOTE — TELEPHONE ENCOUNTER
Provider:  Thomas  Caller: john buenrostro  Time of call:     Phone #:    Surgery:  ACDF  Surgery Date:  05/17/19  Last visit:   12/01/20  Next visit: None    OJRGE:     10/14/2020 Hydrocodone/Acetaminophen  325MG/10MG  1980 90 30 FAN REYNA [facility] [place] 30 1  11/23/2020 Hydrocodone/Acetaminophen  325MG/10MG  1980 90 30 RODY HARRY [facility] [place] 30 1  12/29/2020 Hydrocodone/Acetaminophen  325MG/10MG  1980 90 30 ELLIOTT KENNEDY [facility] [place] 30 1  01/15/2021 Gabapentin 300MG 1980 90 30 RODY HARRY [facility] [place] 1  01/22/2021 Gabapentin 600MG 1980 90 30 BRIDGETT [place] [facility] [place] 1    Reason for call:     Patient requests refill on Cymbalta.

## 2021-03-11 ENCOUNTER — OFFICE VISIT (OUTPATIENT)
Dept: UROLOGY | Facility: CLINIC | Age: 41
End: 2021-03-11

## 2021-03-11 VITALS
TEMPERATURE: 97.8 F | BODY MASS INDEX: 28.09 KG/M2 | HEART RATE: 110 BPM | WEIGHT: 179 LBS | HEIGHT: 67 IN | OXYGEN SATURATION: 96 %

## 2021-03-11 DIAGNOSIS — E11.69 TYPE 2 DIABETES MELLITUS WITH OTHER SPECIFIED COMPLICATION, UNSPECIFIED WHETHER LONG TERM INSULIN USE (HCC): ICD-10-CM

## 2021-03-11 DIAGNOSIS — R32 URINARY INCONTINENCE, UNSPECIFIED TYPE: Primary | ICD-10-CM

## 2021-03-11 LAB
BILIRUB BLD-MCNC: NEGATIVE MG/DL
CLARITY, POC: CLEAR
COLOR UR: YELLOW
GLUCOSE UR STRIP-MCNC: ABNORMAL MG/DL
KETONES UR QL: NEGATIVE
LEUKOCYTE EST, POC: NEGATIVE
NITRITE UR-MCNC: NEGATIVE MG/ML
PH UR: 6 [PH] (ref 5–8)
PROT UR STRIP-MCNC: NEGATIVE MG/DL
RBC # UR STRIP: NEGATIVE /UL
SP GR UR: 1.02 (ref 1–1.03)
UROBILINOGEN UR QL: NORMAL

## 2021-03-11 PROCEDURE — 51798 US URINE CAPACITY MEASURE: CPT | Performed by: UROLOGY

## 2021-03-11 PROCEDURE — 81003 URINALYSIS AUTO W/O SCOPE: CPT | Performed by: UROLOGY

## 2021-03-11 PROCEDURE — 99204 OFFICE O/P NEW MOD 45 MIN: CPT | Performed by: UROLOGY

## 2021-03-11 RX ORDER — SODIUM CHLORIDE 0.9 % (FLUSH) 0.9 %
3-10 SYRINGE (ML) INJECTION AS NEEDED
Status: CANCELLED | OUTPATIENT
Start: 2021-03-11

## 2021-03-11 RX ORDER — SITAGLIPTIN 100 MG/1
100 TABLET, FILM COATED ORAL DAILY
COMMUNITY
Start: 2021-01-14

## 2021-03-11 RX ORDER — SUMATRIPTAN 50 MG/1
50 TABLET, FILM COATED ORAL TAKE AS DIRECTED
COMMUNITY
Start: 2021-01-22

## 2021-03-11 RX ORDER — SODIUM CHLORIDE, SODIUM LACTATE, POTASSIUM CHLORIDE, CALCIUM CHLORIDE 600; 310; 30; 20 MG/100ML; MG/100ML; MG/100ML; MG/100ML
100 INJECTION, SOLUTION INTRAVENOUS CONTINUOUS
Status: CANCELLED | OUTPATIENT
Start: 2021-03-11

## 2021-03-11 RX ORDER — BLOOD SUGAR DIAGNOSTIC
1 STRIP MISCELLANEOUS AS NEEDED
COMMUNITY
Start: 2021-01-14

## 2021-03-11 RX ORDER — SODIUM CHLORIDE 0.9 % (FLUSH) 0.9 %
3 SYRINGE (ML) INJECTION EVERY 12 HOURS SCHEDULED
Status: CANCELLED | OUTPATIENT
Start: 2021-03-11

## 2021-03-11 RX ORDER — GABAPENTIN 600 MG/1
600 TABLET ORAL 3 TIMES DAILY
COMMUNITY
Start: 2021-02-22 | End: 2022-05-12

## 2021-03-11 NOTE — PROGRESS NOTES
Chief Complaint  Chief Complaint   Patient presents with   • Urinary Incontinence      Referring Provider  Sivakumar Tapia MD    HPI  Ms. Ray is a 40 y.o. female presents with complaint of urinary incontinence for greater than 2 years.     Pt has primarily stress urinary incontinence.     The patient admits to stress incontinence during coughing and sneezing and urge incontinence with urgency and frequency for which she has taken oxybutynin in the past. She does report that she believes the stress incontinence is worse and reports using 3 or more pads per day. She denies hematuria, frequent urinary tract infections, constipation, and kidney stones. The patient was referred to our office by Dr. Tapia.    The patient is diabetic, with her most recent hemoglobin A1c being 8.5. She reports that she has made some recent lifestyle changes which should reduce her A1c. The patient is a current smoker.    Severity: Pt uses 3+ pads a day and has tried heavy 1 it he wanted no real quick funny thing in the past  Associated Sx: Pt has + h/o daytime frequency, urgency and nocturia.     No h/o poor stream, straining, hesitancy or incomplete voiding.     No h/o recurrent UTI, hematuria, nephrolithiasis    No vaginal discharge or bleeding.  No h/o something coming out of vagina   No Constipation  2 Vaginal deliveries    Past Medical History  Past Medical History:   Diagnosis Date   • Acid reflux    • Anxiety    • Arthritis    • Bipolar disorder (CMS/[facility])    • Depression    • Diabetes mellitus (CMS/[facility])     type 2, diagnosed 2018, checks fsbg as needed,    • Headache    • Hyperlipidemia    • Hypertension    • Low back pain    • Neck pain    • JANNY (obstructive sleep apnea)     no cpap needed per pt report    • Pancreatitis    • Restless legs syndrome (RLS)    • Seizure (CMS/[facility])     20+ years ago, no meds    • Wears glasses        Past Surgical History  Past Surgical History:   Procedure Laterality Date   • ANTERIOR  CERVICAL DISCECTOMY W/ FUSION N/A 2019    Procedure: CERVICAL DISCECTOMY ANTERIOR WITH FUSION C5-6 AND C6-7;  Surgeon: Chang Lackey MD;  Location: [facility];  Service: Neurosurgery   • CERVICAL BIOPSY  W/ LOOP ELECTRODE EXCISION      10/2000, 2011   •  SECTION     • CHOLECYSTECTOMY     • COLONOSCOPY  2018   • TUBAL ABDOMINAL LIGATION         Medications  Current Outpatient Medications   Medication Sig Dispense Refill   • Accu-Chek Noemí Plus test strip TEST BLOOD SUGAR TWICE A DAY     • atorvastatin (LIPITOR) 40 MG tablet Take 40 mg by mouth Daily.     • buPROPion XL (WELLBUTRIN XL) 150 MG 24 hr tablet Take 150 mg by mouth 2 (Two) Times a Day.  0   • cetirizine (zyrTEC) 10 MG tablet Take 10 mg by mouth Daily.     • Diclofenac Sodium (VOLTAREN) 1 % gel gel APPLY TO AFFECTED AREA 3 TO 4 TIMES A DAY TOPICALLY     • Empagliflozin (JARDIANCE) 25 MG tablet Take 25 mg by mouth Daily.     • escitalopram (LEXAPRO) 20 MG tablet Take 20 mg by mouth Daily.     • fenofibrate 160 MG tablet Take 160 mg by mouth Daily.     • gabapentin (NEURONTIN) 600 MG tablet Take 600 mg by mouth 3 (Three) Times a Day.     • HYDROcodone-acetaminophen (NORCO)  MG per tablet take 1 tablet by mouth every 8 hours  0   • Januvia 100 MG tablet Take 100 mg by mouth Daily.     • losartan (COZAAR) 100 MG tablet Take 100 mg by mouth Daily.     • metFORMIN (GLUCOPHAGE) 1000 MG tablet Take 1,000 mg by mouth Every 12 (Twelve) Hours.     • omeprazole (priLOSEC) 40 MG capsule Take 40 mg by mouth Daily.     • risperiDONE (RISPERDAL) 4 MG tablet Take 4 mg by mouth Daily.     • rOPINIRole (REQUIP) 3 MG tablet Take 3 mg by mouth Every Night.     • SUMAtriptan (IMITREX) 50 MG tablet TAKE ONE TABLET BY MOUTH WITHIN 30 MINUTES OF HEADACHE ONSET     • Vitamin D, Cholecalciferol, 25 MCG (1000 UT) capsule Take 1,000 Units by mouth Daily.       Current Facility-Administered Medications   Medication Dose Route Frequency Provider Last Rate Last  "Admin   • levonorgestrel (MIRENA) 20 MCG/24HR IUD 1 each  1 each Intrauterine Once Janette Coleman PA-C         Facility-Administered Medications Ordered in Other Visits   Medication Dose Route Frequency Provider Last Rate Last Admin   • mupirocin (BACTROBAN) 2 % nasal ointment   Nasal BID Марина Tran PA-C           Allergies  Allergies   Allergen Reactions   • Penicillins Other (See Comments)     \"felt like I was dying\", could not move body,   • Sulfa Antibiotics Nausea Only   • Bactrim [Sulfamethoxazole-Trimethoprim] Nausea And Vomiting   • Ciprofloxacin Rash   • Ibuprofen Rash       Social History  Social History     Socioeconomic History   • Marital status:      Spouse name: Not on file   • Number of children: Not on file   • Years of education: Not on file   • Highest education level: Not on file   Tobacco Use   • Smoking status: Current Every Day Smoker     Packs/day: 1.00     Years: 27.00     Pack years: 27.00     Types: Cigarettes   • Smokeless tobacco: Never Used   Substance and Sexual Activity   • Alcohol use: Yes     Comment: rare   • Drug use: No   • Sexual activity: Defer     Birth control/protection: I.U.D.       Family History  Family History   Problem Relation Age of Onset   • Diabetes Father    • Hyperlipidemia Mother    • Hypertension Mother    • Thyroid disease Sister    • Diabetes Paternal Grandmother    • Skin cancer Maternal Grandfather        Review of Systems  A full ROS was performed and notable for DM poorly controlled.    Physical Exam  Visit Vitals  Pulse 110   Temp 97.8 °F (36.6 °C)   Ht 170.2 cm (67\")   Wt 81.2 kg (179 lb)   SpO2 96%   BMI 28.04 kg/m²     Physical exam was notable for obesity with pannus. Pfannenstiel incision present from prior  sections.     Pelvic exam was notable for positive stress urinary incontinence with cough and Valsalva. Minimal pelvic organ prolapse. Fairly healthy vaginal estrogenization.     Labs  Brief Urine Lab Results  (Last " result in the past 365 days)      Color   Clarity   Blood   Leuk Est   Nitrite   Protein   CREAT   Urine HCG        03/11/21 1556 Yellow Clear Negative Negative Negative Negative               Lab Results   Component Value Date    GLUCOSE 145 (H) 05/16/2019    CALCIUM 9.2 05/16/2019     05/16/2019    K 4.6 05/17/2019    CO2 25.0 05/16/2019     05/16/2019    BUN 15 05/16/2019    CREATININE 0.68 05/16/2019    EGFRIFNONA 96 05/16/2019    BCR 22.1 05/16/2019    ANIONGAP 14.0 05/16/2019       Lab Results   Component Value Date    WBC 17.19 (H) 05/16/2019    HGB 15.0 05/16/2019    HCT 45.1 05/16/2019    MCV 83.7 05/16/2019     05/16/2019     Lab Results   Component Value Date    HGBA1C 8.50 (H) 01/06/2021         PVR  Post-void residual performed by staff - 21 mL.    I have personally reviewed her labs and post void residual imaging.     Assessment  Ms. Ray is a 40 y.o. female with obesity, diabetes mellitus, and stress predominant urinary incontinence. She has failed pelvic floor physical therapy in the past.     We discussed the AUA guidelines for management of stress urinary incontinence.  We discussed served of management with lifestyle changes like weight reduction and pelvic floor physical therapy.  We discussed surgical management with either mid urethral sling versus cystoscopy with bulking agent injection.  The patient has elected midurethral sling.  We discussed the risks, benefits, and alternatives.  The patient voiced her understanding and wished to proceed.    Plan  1. Schedule for midurethral sling.  Her risk factors associated with this procedure include uncontrolled diabetes mellitus and obesity  2. PAT testing and repeat A1c 2 weeks prior and COVID testing 72 hours prior.    Scribed for Vishal Mercedes MD by STEVEN WETZEL.  03/12/21   09:17 EST    I have personally performed the services described in this document as scribed by the above individual, and it is both accurate and  complete.  Vishal Mercedes MD  3/12/2021  15:55 EST

## 2021-03-12 PROBLEM — R32 URINARY INCONTINENCE: Status: ACTIVE | Noted: 2021-03-12

## 2021-03-23 ENCOUNTER — BULK ORDERING (OUTPATIENT)
Dept: CASE MANAGEMENT | Facility: OTHER | Age: 41
End: 2021-03-23

## 2021-03-23 DIAGNOSIS — Z23 IMMUNIZATION DUE: ICD-10-CM

## 2021-03-31 ENCOUNTER — APPOINTMENT (OUTPATIENT)
Dept: PREADMISSION TESTING | Facility: HOSPITAL | Age: 41
End: 2021-03-31

## 2021-03-31 VITALS — BODY MASS INDEX: 29.98 KG/M2 | WEIGHT: 191 LBS | HEIGHT: 67 IN

## 2021-03-31 DIAGNOSIS — R32 URINARY INCONTINENCE, UNSPECIFIED TYPE: ICD-10-CM

## 2021-03-31 LAB
B-HCG UR QL: NEGATIVE
QT INTERVAL: 360 MS
QTC INTERVAL: 435 MS

## 2021-03-31 PROCEDURE — 93005 ELECTROCARDIOGRAM TRACING: CPT

## 2021-03-31 PROCEDURE — 93010 ELECTROCARDIOGRAM REPORT: CPT | Performed by: INTERNAL MEDICINE

## 2021-03-31 PROCEDURE — 81003 URINALYSIS AUTO W/O SCOPE: CPT | Performed by: UROLOGY

## 2021-03-31 PROCEDURE — 83036 HEMOGLOBIN GLYCOSYLATED A1C: CPT | Performed by: UROLOGY

## 2021-03-31 PROCEDURE — 85027 COMPLETE CBC AUTOMATED: CPT | Performed by: UROLOGY

## 2021-03-31 PROCEDURE — 81025 URINE PREGNANCY TEST: CPT

## 2021-03-31 PROCEDURE — 80048 BASIC METABOLIC PNL TOTAL CA: CPT | Performed by: UROLOGY

## 2021-04-09 ENCOUNTER — TELEPHONE (OUTPATIENT)
Dept: UROLOGY | Facility: CLINIC | Age: 41
End: 2021-04-09

## 2021-04-12 ENCOUNTER — LAB (OUTPATIENT)
Dept: LAB | Facility: HOSPITAL | Age: 41
End: 2021-04-12

## 2021-04-12 DIAGNOSIS — E11.69 TYPE 2 DIABETES MELLITUS WITH OTHER SPECIFIED COMPLICATION, UNSPECIFIED WHETHER LONG TERM INSULIN USE (HCC): ICD-10-CM

## 2021-04-12 DIAGNOSIS — R32 URINARY INCONTINENCE, UNSPECIFIED TYPE: Primary | ICD-10-CM

## 2021-04-12 DIAGNOSIS — Z01.818 PRE-OP TESTING: Primary | ICD-10-CM

## 2021-04-12 PROCEDURE — C9803 HOPD COVID-19 SPEC COLLECT: HCPCS

## 2021-04-12 PROCEDURE — U0004 COV-19 TEST NON-CDC HGH THRU: HCPCS

## 2021-04-12 RX ORDER — SULFAMETHOXAZOLE AND TRIMETHOPRIM 800; 160 MG/1; MG/1
1 TABLET ORAL DAILY
Qty: 5 TABLET | Refills: 0 | Status: SHIPPED | OUTPATIENT
Start: 2021-04-12 | End: 2021-04-14 | Stop reason: HOSPADM

## 2021-04-12 RX ORDER — FLUCONAZOLE 100 MG/1
100 TABLET ORAL DAILY
Qty: 5 TABLET | Refills: 0 | Status: SHIPPED | OUTPATIENT
Start: 2021-04-12 | End: 2021-04-17

## 2021-04-12 NOTE — TELEPHONE ENCOUNTER
I see she has a history of diabetes and is at increased risk for infection therefore I sent in Diflucan and Bactrim

## 2021-04-12 NOTE — TELEPHONE ENCOUNTER
PT IS REFERRING TO A ANTIBIOTIC SAYS THAT DR. MARAVILLA SAID SHE WOULD HAVE TO TAKE THIS MEDICATION FOR 5 DAYS PRIOR TO SURGERY .   [facility]. [facility].  PLEASE ADVISE IF SHE SUPPOSE TO TAKE ANY MEDICATION PRIOR TO SURGERY -[phone]

## 2021-04-13 LAB — SARS-COV-2 RNA NOSE QL NAA+PROBE: NOT DETECTED

## 2021-04-14 ENCOUNTER — ANESTHESIA (OUTPATIENT)
Dept: PERIOP | Facility: HOSPITAL | Age: 41
End: 2021-04-14

## 2021-04-14 ENCOUNTER — HOSPITAL ENCOUNTER (OUTPATIENT)
Facility: HOSPITAL | Age: 41
Setting detail: HOSPITAL OUTPATIENT SURGERY
Discharge: HOME | End: 2021-04-14
Attending: UROLOGY | Admitting: UROLOGY

## 2021-04-14 ENCOUNTER — ANESTHESIA EVENT (OUTPATIENT)
Dept: PERIOP | Facility: HOSPITAL | Age: 41
End: 2021-04-14

## 2021-04-14 VITALS
DIASTOLIC BLOOD PRESSURE: 60 MMHG | TEMPERATURE: 97.8 F | RESPIRATION RATE: 16 BRPM | OXYGEN SATURATION: 99 % | HEART RATE: 88 BPM | SYSTOLIC BLOOD PRESSURE: 112 MMHG

## 2021-04-14 DIAGNOSIS — R32 URINARY INCONTINENCE, UNSPECIFIED TYPE: ICD-10-CM

## 2021-04-14 LAB
B-HCG UR QL: NEGATIVE
GLUCOSE BLDC GLUCOMTR-MCNC: 94 MG/DL (ref 70–130)
GLUCOSE BLDC GLUCOMTR-MCNC: 94 MG/DL (ref 70–130)
INTERNAL NEGATIVE CONTROL: NEGATIVE
INTERNAL POSITIVE CONTROL: POSITIVE
Lab: NORMAL

## 2021-04-14 PROCEDURE — 25010000002 GENTAMICIN PER 80 MG: Performed by: UROLOGY

## 2021-04-14 PROCEDURE — 94799 UNLISTED PULMONARY SVC/PX: CPT

## 2021-04-14 PROCEDURE — 82962 GLUCOSE BLOOD TEST: CPT

## 2021-04-14 PROCEDURE — 81025 URINE PREGNANCY TEST: CPT | Performed by: UROLOGY

## 2021-04-14 PROCEDURE — 25010000002 MIDAZOLAM PER 1MG: Performed by: NURSE ANESTHETIST, CERTIFIED REGISTERED

## 2021-04-14 PROCEDURE — C1771 REP DEV, URINARY, W/SLING: HCPCS | Performed by: UROLOGY

## 2021-04-14 PROCEDURE — 25010000002 PROPOFOL 200 MG/20ML EMULSION: Performed by: NURSE ANESTHETIST, CERTIFIED REGISTERED

## 2021-04-14 PROCEDURE — 25010000002 HYDROMORPHONE 1 MG/ML SOLUTION: Performed by: NURSE ANESTHETIST, CERTIFIED REGISTERED

## 2021-04-14 PROCEDURE — 25010000002 DEXAMETHASONE PER 1 MG: Performed by: NURSE ANESTHETIST, CERTIFIED REGISTERED

## 2021-04-14 PROCEDURE — 25010000002 FENTANYL CITRATE (PF) 100 MCG/2ML SOLUTION: Performed by: NURSE ANESTHETIST, CERTIFIED REGISTERED

## 2021-04-14 PROCEDURE — 25010000002 ONDANSETRON PER 1 MG: Performed by: NURSE ANESTHETIST, CERTIFIED REGISTERED

## 2021-04-14 PROCEDURE — 25010000002 HALOPERIDOL LACTATE PER 5 MG: Performed by: NURSE ANESTHETIST, CERTIFIED REGISTERED

## 2021-04-14 PROCEDURE — 57288 REPAIR BLADDER DEFECT: CPT | Performed by: UROLOGY

## 2021-04-14 DEVICE — TRANSVAGINAL MID-URETHRAL SLING
Type: IMPLANTABLE DEVICE | Site: URETHRA | Status: FUNCTIONAL
Brand: ADVANTAGE FIT™ BLUE SYSTEM

## 2021-04-14 DEVICE — HEMOST ABS SURGIFOAM SZ100 8X12 10MM: Type: IMPLANTABLE DEVICE | Site: URETHRA | Status: FUNCTIONAL

## 2021-04-14 DEVICE — FLOSEAL HEMOSTATIC MATRIX, 5 ML
Type: IMPLANTABLE DEVICE | Site: URETHRA | Status: FUNCTIONAL
Brand: FLOSEAL

## 2021-04-14 RX ORDER — SODIUM CHLORIDE, SODIUM LACTATE, POTASSIUM CHLORIDE, CALCIUM CHLORIDE 600; 310; 30; 20 MG/100ML; MG/100ML; MG/100ML; MG/100ML
100 INJECTION, SOLUTION INTRAVENOUS CONTINUOUS
Status: DISCONTINUED | OUTPATIENT
Start: 2021-04-14 | End: 2021-04-14 | Stop reason: HOSPADM

## 2021-04-14 RX ORDER — ONDANSETRON 2 MG/ML
INJECTION INTRAMUSCULAR; INTRAVENOUS AS NEEDED
Status: DISCONTINUED | OUTPATIENT
Start: 2021-04-14 | End: 2021-04-14 | Stop reason: SURG

## 2021-04-14 RX ORDER — PROMETHAZINE HYDROCHLORIDE 25 MG/1
25 TABLET ORAL ONCE AS NEEDED
Status: DISCONTINUED | OUTPATIENT
Start: 2021-04-14 | End: 2021-04-14 | Stop reason: HOSPADM

## 2021-04-14 RX ORDER — MAGNESIUM HYDROXIDE 1200 MG/15ML
LIQUID ORAL AS NEEDED
Status: DISCONTINUED | OUTPATIENT
Start: 2021-04-14 | End: 2021-04-14 | Stop reason: HOSPADM

## 2021-04-14 RX ORDER — EPHEDRINE SULFATE 5 MG/ML
INJECTION INTRAVENOUS AS NEEDED
Status: DISCONTINUED | OUTPATIENT
Start: 2021-04-14 | End: 2021-04-14 | Stop reason: SURG

## 2021-04-14 RX ORDER — SODIUM CHLORIDE 0.9 % (FLUSH) 0.9 %
10 SYRINGE (ML) INJECTION AS NEEDED
Status: DISCONTINUED | OUTPATIENT
Start: 2021-04-14 | End: 2021-04-14 | Stop reason: HOSPADM

## 2021-04-14 RX ORDER — SODIUM CHLORIDE 9 MG/ML
INJECTION, SOLUTION INTRAVENOUS AS NEEDED
Status: DISCONTINUED | OUTPATIENT
Start: 2021-04-14 | End: 2021-04-14 | Stop reason: HOSPADM

## 2021-04-14 RX ORDER — LIDOCAINE HYDROCHLORIDE 20 MG/ML
INJECTION, SOLUTION INTRAVENOUS AS NEEDED
Status: DISCONTINUED | OUTPATIENT
Start: 2021-04-14 | End: 2021-04-14 | Stop reason: SURG

## 2021-04-14 RX ORDER — SODIUM CHLORIDE 0.9 % (FLUSH) 0.9 %
3 SYRINGE (ML) INJECTION EVERY 12 HOURS SCHEDULED
Status: DISCONTINUED | OUTPATIENT
Start: 2021-04-14 | End: 2021-04-14 | Stop reason: HOSPADM

## 2021-04-14 RX ORDER — DOCUSATE SODIUM 100 MG/1
100 CAPSULE, LIQUID FILLED ORAL 2 TIMES DAILY
Qty: 15 CAPSULE | Refills: 1 | Status: SHIPPED | OUTPATIENT
Start: 2021-04-14 | End: 2022-05-12

## 2021-04-14 RX ORDER — IPRATROPIUM BROMIDE AND ALBUTEROL SULFATE 2.5; .5 MG/3ML; MG/3ML
3 SOLUTION RESPIRATORY (INHALATION) ONCE AS NEEDED
Status: DISCONTINUED | OUTPATIENT
Start: 2021-04-14 | End: 2021-04-14 | Stop reason: HOSPADM

## 2021-04-14 RX ORDER — MEPERIDINE HYDROCHLORIDE 25 MG/ML
12.5 INJECTION INTRAMUSCULAR; INTRAVENOUS; SUBCUTANEOUS
Status: DISCONTINUED | OUTPATIENT
Start: 2021-04-14 | End: 2021-04-14 | Stop reason: HOSPADM

## 2021-04-14 RX ORDER — HALOPERIDOL 5 MG/ML
INJECTION INTRAMUSCULAR AS NEEDED
Status: DISCONTINUED | OUTPATIENT
Start: 2021-04-14 | End: 2021-04-14 | Stop reason: SURG

## 2021-04-14 RX ORDER — OXYCODONE HYDROCHLORIDE 5 MG/1
5 TABLET ORAL EVERY 6 HOURS PRN
Qty: 10 TABLET | Refills: 0 | Status: SHIPPED | OUTPATIENT
Start: 2021-04-14 | End: 2022-05-12

## 2021-04-14 RX ORDER — DEXAMETHASONE SODIUM PHOSPHATE 4 MG/ML
INJECTION, SOLUTION INTRA-ARTICULAR; INTRALESIONAL; INTRAMUSCULAR; INTRAVENOUS; SOFT TISSUE AS NEEDED
Status: DISCONTINUED | OUTPATIENT
Start: 2021-04-14 | End: 2021-04-14 | Stop reason: SURG

## 2021-04-14 RX ORDER — ACETAMINOPHEN 325 MG/1
325 TABLET ORAL EVERY 6 HOURS
Qty: 30 TABLET | Refills: 0 | Status: SHIPPED | OUTPATIENT
Start: 2021-04-14 | End: 2021-04-21

## 2021-04-14 RX ORDER — FENTANYL CITRATE 50 UG/ML
INJECTION, SOLUTION INTRAMUSCULAR; INTRAVENOUS AS NEEDED
Status: DISCONTINUED | OUTPATIENT
Start: 2021-04-14 | End: 2021-04-14 | Stop reason: SURG

## 2021-04-14 RX ORDER — SODIUM CHLORIDE 0.9 % (FLUSH) 0.9 %
3-10 SYRINGE (ML) INJECTION AS NEEDED
Status: DISCONTINUED | OUTPATIENT
Start: 2021-04-14 | End: 2021-04-14 | Stop reason: HOSPADM

## 2021-04-14 RX ORDER — CLINDAMYCIN PHOSPHATE 900 MG/50ML
900 INJECTION, SOLUTION INTRAVENOUS ONCE
Status: COMPLETED | OUTPATIENT
Start: 2021-04-14 | End: 2021-04-14

## 2021-04-14 RX ORDER — CLINDAMYCIN HYDROCHLORIDE 150 MG/1
150 CAPSULE ORAL 2 TIMES DAILY
Qty: 10 CAPSULE | Refills: 0 | Status: SHIPPED | OUTPATIENT
Start: 2021-04-14 | End: 2021-04-19

## 2021-04-14 RX ORDER — MIDAZOLAM HYDROCHLORIDE 2 MG/2ML
INJECTION, SOLUTION INTRAMUSCULAR; INTRAVENOUS AS NEEDED
Status: DISCONTINUED | OUTPATIENT
Start: 2021-04-14 | End: 2021-04-14 | Stop reason: SURG

## 2021-04-14 RX ORDER — BUPIVACAINE HYDROCHLORIDE AND EPINEPHRINE 2.5; 5 MG/ML; UG/ML
INJECTION, SOLUTION EPIDURAL; INFILTRATION; INTRACAUDAL; PERINEURAL AS NEEDED
Status: DISCONTINUED | OUTPATIENT
Start: 2021-04-14 | End: 2021-04-14 | Stop reason: HOSPADM

## 2021-04-14 RX ORDER — ONDANSETRON 2 MG/ML
4 INJECTION INTRAMUSCULAR; INTRAVENOUS ONCE AS NEEDED
Status: DISCONTINUED | OUTPATIENT
Start: 2021-04-14 | End: 2021-04-14 | Stop reason: HOSPADM

## 2021-04-14 RX ORDER — PROPOFOL 10 MG/ML
INJECTION, EMULSION INTRAVENOUS AS NEEDED
Status: DISCONTINUED | OUTPATIENT
Start: 2021-04-14 | End: 2021-04-14 | Stop reason: SURG

## 2021-04-14 RX ORDER — BUPIVACAINE HCL/0.9 % NACL/PF 0.125 %
PLASTIC BAG, INJECTION (ML) EPIDURAL AS NEEDED
Status: DISCONTINUED | OUTPATIENT
Start: 2021-04-14 | End: 2021-04-14 | Stop reason: SURG

## 2021-04-14 RX ORDER — PROMETHAZINE HYDROCHLORIDE 25 MG/1
25 SUPPOSITORY RECTAL ONCE AS NEEDED
Status: DISCONTINUED | OUTPATIENT
Start: 2021-04-14 | End: 2021-04-14 | Stop reason: HOSPADM

## 2021-04-14 RX ADMIN — CLINDAMYCIN PHOSPHATE 900 MG: 900 INJECTION, SOLUTION INTRAVENOUS at 11:10

## 2021-04-14 RX ADMIN — Medication 100 MCG: at 11:26

## 2021-04-14 RX ADMIN — LIDOCAINE HYDROCHLORIDE 100 MG: 20 INJECTION, SOLUTION INTRAVENOUS at 11:08

## 2021-04-14 RX ADMIN — SODIUM CHLORIDE, POTASSIUM CHLORIDE, SODIUM LACTATE AND CALCIUM CHLORIDE: 600; 310; 30; 20 INJECTION, SOLUTION INTRAVENOUS at 11:56

## 2021-04-14 RX ADMIN — EPHEDRINE SULFATE 10 MG: 5 INJECTION INTRAVENOUS at 11:39

## 2021-04-14 RX ADMIN — DEXAMETHASONE SODIUM PHOSPHATE 4 MG: 4 INJECTION, SOLUTION INTRAMUSCULAR; INTRAVENOUS at 12:13

## 2021-04-14 RX ADMIN — FENTANYL CITRATE 50 MCG: 50 INJECTION INTRAMUSCULAR; INTRAVENOUS at 11:12

## 2021-04-14 RX ADMIN — HALOPERIDOL LACTATE 0.5 MG: 5 INJECTION, SOLUTION INTRAMUSCULAR at 12:13

## 2021-04-14 RX ADMIN — Medication 50 MCG: at 11:17

## 2021-04-14 RX ADMIN — ONDANSETRON 4 MG: 2 INJECTION INTRAMUSCULAR; INTRAVENOUS at 12:13

## 2021-04-14 RX ADMIN — GENTAMICIN SULFATE 350 MG: 40 INJECTION, SOLUTION INTRAMUSCULAR; INTRAVENOUS at 11:25

## 2021-04-14 RX ADMIN — HYDROMORPHONE HYDROCHLORIDE 0.5 MG: 1 INJECTION, SOLUTION INTRAMUSCULAR; INTRAVENOUS; SUBCUTANEOUS at 12:37

## 2021-04-14 RX ADMIN — Medication 100 MCG: at 11:21

## 2021-04-14 RX ADMIN — EPHEDRINE SULFATE 10 MG: 5 INJECTION INTRAVENOUS at 11:44

## 2021-04-14 RX ADMIN — MIDAZOLAM HYDROCHLORIDE 2 MG: 1 INJECTION, SOLUTION INTRAMUSCULAR; INTRAVENOUS at 11:02

## 2021-04-14 RX ADMIN — PROPOFOL 250 MG: 10 INJECTION, EMULSION INTRAVENOUS at 11:08

## 2021-04-14 RX ADMIN — Medication 200 MCG: at 11:56

## 2021-04-14 RX ADMIN — SODIUM CHLORIDE, POTASSIUM CHLORIDE, SODIUM LACTATE AND CALCIUM CHLORIDE 100 ML/HR: 600; 310; 30; 20 INJECTION, SOLUTION INTRAVENOUS at 10:07

## 2021-04-14 NOTE — H&P
HPI  Kim Ray is a 40 y.o. with history of   1. Urinary incontinence, unspecified type         No recent fevers or new LUTS  Does not take any blood thinners    Past Medical History  Past Medical History:   Diagnosis Date   • Acid reflux    • Anxiety    • Arthritis     OA   • Bipolar disorder (CMS/HCC)    • Body piercing     ears   • Depression    • Diabetes mellitus (CMS/HCC)     type 2, diagnosed , checks fsbg as needed,    • Dysphasia     Reported intermittently has difficulty with swallowing pills and solid foods   • Elevated cholesterol    • Headache    • History of bronchitis    • History of chest pain 2021    Reported >1 year ago with visit to [facility] with dismissal to home. Reported was not cardiac related but was secondary to anxiety.   • Hyperlipidemia    • Hypertension    • Low back pain    • Neck pain    • JANNY (obstructive sleep apnea)     Patient reported prior sleep study and reported she is unsure if she was diagnosed with this.  Reported she has never been Rx CPAP.   • Pancreatitis    • Restless legs syndrome (RLS)    • Seizure (CMS/HCC)     20+ years ago, no meds    • Wears glasses     Reported she typically does not wear them       Past Surgical History  Past Surgical History:   Procedure Laterality Date   • ANTERIOR CERVICAL DISCECTOMY W/ FUSION N/A 2019    Procedure: CERVICAL DISCECTOMY ANTERIOR WITH FUSION C5-6 AND C6-7;  Surgeon: Chang Lackey MD;  Location: [facility];  Service: Neurosurgery   • CERVICAL BIOPSY  W/ LOOP ELECTRODE EXCISION      10/2000, 2011   •  SECTION      x1   • CHOLECYSTECTOMY     • COLONOSCOPY  2018   • TUBAL ABDOMINAL LIGATION     • WISDOM TOOTH EXTRACTION         Medications    Current Facility-Administered Medications:   •  clindamycin (CLEOCIN) 900 mg in sodium chloride 0.9% 50 mL IVPB (premix), 900 mg, Intravenous, Once, Vishal Mercedes MD  •  gentamicin (GARAMYCIN) 350 mg in sodium chloride 0.9 % 100 mL IVPB, 5  "mg/kg (Adjusted), Intravenous, Once, Vishal Mercedes MD  •  lactated ringers infusion, 100 mL/hr, Intravenous, Continuous, Vishal Mercedes MD, Last Rate: 100 mL/hr at 04/14/21 1007, 100 mL/hr at 04/14/21 1007  •  sodium chloride 0.9 % flush 10 mL, 10 mL, Intravenous, PRN, Vishal Mercedes MD  •  sodium chloride 0.9 % flush 3 mL, 3 mL, Intravenous, Q12H, Vishal Mercedes MD  •  sodium chloride 0.9 % flush 3-10 mL, 3-10 mL, Intravenous, PRN, Vishal Mercedes MD    Facility-Administered Medications Ordered in Other Encounters:   •  mupirocin (BACTROBAN) 2 % nasal ointment, , Nasal, BID, Марина Tran PA-C    Allergies  Allergies   Allergen Reactions   • Penicillins Other (See Comments)     \"felt like I was dying\", could not move body,   • Bactrim [Sulfamethoxazole-Trimethoprim] Nausea And Vomiting   • Chlorhexidine Rash   • Ciprofloxacin Rash   • Ibuprofen Rash   • Sulfa Antibiotics Nausea Only       Social History  Social History     Socioeconomic History   • Marital status:      Spouse name: Not on file   • Number of children: Not on file   • Years of education: Not on file   • Highest education level: Not on file   Tobacco Use   • Smoking status: Current Every Day Smoker     Packs/day: 1.00     Years: 27.00     Pack years: 27.00     Types: Cigarettes   • Smokeless tobacco: Never Used   Vaping Use   • Vaping Use: Never used   Substance and Sexual Activity   • Alcohol use: Yes     Comment: rare   • Drug use: No   • Sexual activity: Defer     Birth control/protection: I.U.D.       Review of Systems  Constitutional: No fevers or chills  Skin: Negative for rash  Endocrine: No heat/cold intolerance   Cardiovascular: Negative for chest pain or dyspnea on exertion  Respiratory: Negative for shortness of breath or wheezing  Gastrointestinal: No constipation, nausea or vomiting  Genitourinary: Negative for new lower urinary tract symptoms, current gross hematuria or " dysuria.  Musculoskeletal: No flank pain  Neurological:  Negative for frequent headaches or dizziness  Lymph/Heme: Negative for leg swelling or calf pain.    Physical Exam  Visit Vitals  /77 (BP Location: Left arm, Patient Position: Sitting)   Pulse 95   Temp 97.5 °F (36.4 °C) (Temporal)   Resp 17   SpO2 95%     Constitutional: NAD, WDWN.   HEENT: NCAT. Conjunctivae normal.  MMM.    Cardiovascular: Regular rate.  Pulmonary/Chest: Respirations are even and non-labored bilaterally.  Abdominal: Soft. No distension, tenderness, masses or guarding. No CVA tenderness.  Neurological: A + O x 3.  Cranial Nerves II-XII grossly intact. Normal gait.  Extremities: PABLO x 4, Warm. No clubbing.  No cyanosis.    Skin: Pink, warm and dry.  No rashes noted.  Psychiatric:  Normal mood and affect    Labs  @LASTLABOSUSHORT(SODIUM,POTASSIUM,CHLORIDE,CO2,BUN,CREATSERUM,GLUCOSE)@  @LASTLABOSUSHORT(WBC,WBCCOUNT,WBCFETAL,HGB,HCT,PLATELET,MCV)@   No results found for: PSA  No components found for: CREATSERUM      Assessment  Kim Ray is a 40 y.o. female who presents with the following diagnosis:  1. Urinary incontinence, unspecified type         Plan  1. To OR for MID-URETHRAL SLING WITH CYSTOSCOPY     Vishal Mercedes MD

## 2021-04-14 NOTE — ANESTHESIA PREPROCEDURE EVALUATION
Anesthesia Evaluation     Patient summary reviewed   no history of anesthetic complications:  NPO Solid Status: > 8 hours  NPO Liquid Status: > 2 hours           Airway   Mallampati: III  TM distance: <3 FB  Neck ROM: full  Possible difficult intubation  Comment: Moving neck does not exacerbate symptoms  Dental    (+) poor dentition    Pulmonary    (+) a smoker Current, sleep apnea, decreased breath sounds,   COPD:  inc risk   Cardiovascular - normal exam  Exercise tolerance: good (4-7 METS)    ECG reviewed    (+) hypertension, hyperlipidemia,   (-) valvular problems/murmurs, dysrhythmias, angina ( hx of angina), ZUNIGA      Neuro/Psych  (+) seizures (as child ), headaches, psychiatric history Anxiety, Depression and Bipolar,     GI/Hepatic/Renal/Endo    (+)  GERD well controlled,  diabetes mellitus well controlled,   (-) liver disease, no renal disease    Musculoskeletal     (+) arthralgias, myalgias, neck pain, radiculopathy Right upper extremity  Abdominal   (+) obese,    Substance History      OB/GYN          Other   arthritis,      ROS/Med Hx Other: rls  Hx of sz 20 yrs ago, no meds presently  Labs reviewed  ekg sr                    Anesthesia Plan    ASA 3     general   (Risks and benefits discussed including risk of aspiration, recall and dental damage. All patient questions answered.    Will continue with plan of care.)  intravenous induction     Anesthetic plan, all risks, benefits, and alternatives have been provided, discussed and informed consent has been obtained with: patient.  Use of blood products discussed with consented to blood products.

## 2021-04-14 NOTE — ANESTHESIA PROCEDURE NOTES
Airway  Urgency: elective    Date/Time: 4/14/2021 11:09 AM    General Information and Staff    Patient location during procedure: OR  CRNA: Jenny Heller CRNA    Indications and Patient Condition  Indications for airway management: airway protection    Preoxygenated: yes  Mask difficulty assessment: 1 - vent by mask    Final Airway Details  Final airway type: supraglottic airway      Successful airway: unique  Size 3  Airway Seal Pressure (cm H2O): 20    Number of attempts at approach: 2  Assessment: lips, teeth, and gum same as pre-op    Additional Comments  LMA seats well, attempted to seat #4 LMA, unable to advance to posterior oropharynx, #3 seated well.

## 2021-04-14 NOTE — OP NOTE
PREOPERATIVE DIAGNOSES:   1. Stress urinary incontinence.       POSTOPERATIVE DIAGNOSES:   1. Stress urinary incontinence.      PROCEDURE:   1. Mid-urethral sling utilizing the Advantage Fit system made by [employer] - CPT 23507  2. Cystoscopy.      SURGEON: Vishal Mercedes MD.      ANESTHESIA: General anesthesia.      COMPLICATIONS: None.      SPECIMEN: None.      BLOOD LOSS: 25 cc.      INDICATION OF PROCEDURE: Ms. Kim Ray is a 40 y.o.-year-old female with known history of stress urinary incontinence. . After a complete evaluation, the patient was recommended to undergo a mid-urethral sling procedure.      All perioperative information was given to the patient, including the recent FDA recommendation about the use of synthetic mesh. The patient was also informed about the different perioperative complications including urinary retention, urge incontinence, hematuria, pelvic pain, and need for additional surgery to remove the mesh.         DESCRIPTION OF THE PROCEDURE:    The patient was taken to the main operating theater and placed in supine position. Sequential compression devices were applied to both her lower extremities. She underwent a smooth induction of general anesthesia. She was placed in dorsal lithotomy position, with all her pressure points carefully padded. Her perineum was sterilely prepped and draped. An operative time-out was performed, identifying the patient and the surgical procedure. The patient received intravenous Ancef and Gentamicin prior to the surgical procedure.      We placed an 18-[place] citizen Carrera catheter, and a [place] retractor was used, along with blue hooks for vaginal exposure. A detailed examination was performed, and patient was found to have grade 1 cystocele and vault prolapse in the midline. There was no evidence of any vaginal wall erosion or pathology. The anterior vaginal wall was infiltrated with local anesthetic containing epinephrine. We made a  veritcal incision in the anterior vaginal wall. The incision was approximately 3 cm long, going over the mid urethra. The cut edges of the incision were held together with Allis forceps, and sharp dissection was performed with Metzenbaum scissors to raise the vaginal wall flaps. Flaps were raised on both sides, and further dissection was performed with the help of blunt dissection. We completed the further dissection around the urethra, reaching up to the retropubic space.      We were then ready to pass the midurethral sling. For this, we used the Advantage Fit system made by [employer]. The bladder was drained completely. To place the trocar, we initially placed a catheter guide through the 18-[place] Carrera catheter, and the bladder neck and bladder were displaced away from the trocar. Following this, the left trocar was passed, first entering the retropubic space, and then the trocar was passed in an inside-out fashion posterior to the pubic ramus, coming out in the suprapubic region. A small skin incision was made, and the trocar was taken out. Similarly, the trocar was passed through the right side of the patient in a similar fashion.      The Carrera catheter was then taken out, and rigid cystoscopy was performed with 30 and 70-degree telescopes. On cystoscopy, there was no evidence of any bladder injury. No mesh was seen in bladder. Both the trocars were found to be in satisfactory position. At this point, the cystoscope was taken out, and the Carrera catheter was reinserted.      Next, we tensioned the midurethral sling by pulling on the plastic sheath of the sling system. We made sure that the sling was relatively snug but not causing any obstruction of the mid urethra. This completed the midurethral sling process.      The excess of the mesh at the level of skin was trimmed, and the skin was closed with 4-0 monocryl. The vaginal incision was copiously irrigated with bacitracin-containing solution. We  placed Gelfoam for hemostasis. We performed a single layer closure of the vagina with 3-0 Polysorb suture in an continuous running fashion. At the completion of the procedure, there was excellent hemostasis, and a small vaginal pack was placed containing Premarin cream.      The instrument count, sponge count and the needle count was correct x2, and both the counts were uninterrupted. The patient was extubated and transferred to PACU in stable condition.     She will follow up with me in the office tomorrow for removal of the thompson catheter and vaginal packing.

## 2021-04-14 NOTE — ANESTHESIA POSTPROCEDURE EVALUATION
Patient: Kim Ray    Procedure Summary     Date: 04/14/21 Room / Location: [facility] OR 1 /  [facility]    Anesthesia Start: 1101 Anesthesia Stop: 1219    Procedure: MID-URETHRAL SLING WITH CYSTOSCOPY (N/A ) Diagnosis:       Urinary incontinence, unspecified type      (Urinary incontinence, unspecified type [R32])    Surgeons: Vishal Mercedes MD Provider: Jenny Heller CRNA    Anesthesia Type: general ASA Status: 3          Anesthesia Type: general    Vitals  Vitals Value Taken Time   /60 04/14/21 1322   Temp 97.4 °F (36.3 °C) 04/14/21 1310   Pulse 81 04/14/21 1325   Resp 16 04/14/21 1320   SpO2 100 % 04/14/21 1325           Post Anesthesia Care and Evaluation    Patient location during evaluation: PHASE II  Patient participation: complete - patient participated  Level of consciousness: awake and alert  Pain score: 1  Pain management: satisfactory to patient  Airway patency: patent  Anesthetic complications: No anesthetic complications  PONV Status: none  Cardiovascular status: acceptable and stable  Respiratory status: acceptable  Hydration status: acceptable

## 2021-04-14 NOTE — DISCHARGE INSTRUCTIONS
Home Care After Mid-urethral sling  The following instructions will help you care for yourself, or be cared for upon your return home today.    These are guidelines for your care right after surgery only.     Diet  Drink plenty of liquids and eat light meals today.  Start your regular diet tomorrow    Activity  No vigorous activity or heavy lifting for 4 weeks.    Wound Care and Hygiene  We will remove the vaginal packing in 24 hours and you may shower after.  The stitches will dissolve in 2-3 weeks and should not need to be removed.    Anesthesia Precautions & Expectations  After having general, monitored or regional anesthesia, rest for 24 hours.   Do not drive, drink alcoholic beverages or make any important decisions during this time.    General anesthesia may cause a sore throat, jaw discomfort or muscle aches.    These symptoms can last for one or two days.     What to Expect after Surgery  Discomfort or tenderness at incision sites for approximately a week.    Call your Doctor  Heavy bleeding at incision sites or bleeding that does not stop.  Severe pain not controlled by oral medication.  Temperature above 101.5 F degrees.  If you are unable to urinate or empty your bladder.    Other Instructions  Apply ice (bag of frozen peas works well) over a towel to the genital area 3-4 times a day until swelling resolves.  (Do NOT apply ice directly to skin)  Take all of the antibiotics  Empty the Carrera catheter bag as it fills  You will need to apply vaginal estrogen cream daily for the next month to the incision in the vagina.    Other Contacts  Urology Office:  [address] [place] [address] [id number]   [place] [address] [phone] office  [phone] fax      Follow up Appointment  You have a follow up scheduled with Dr. Mercedes on 4/15/2021 9:30 AM    Please follow all post op instructions and follow up appointment time from your physician's office included in your discharge packet.    REST TODAY    No pushing, pulling,  tugging,  heavy lifting, or strenuous activity.  No major decision making, driving, or drinking alcoholic beverages for 24 hours. ( due to the medications you have  received)  Always use good hand hygiene/washing techniques.  NO driving while taking pain medications.    * if you have an incision:  Check your incision area every day for signs of infection.   Check for:  * more redness, swelling, or pain  *more fluid or blood  *warmth  *pus or bad smell    To assist you in voiding:  Drink plenty of fluids  Listen to running water while attempting to void.    If you are unable to urinate and you have an uncomfortable urge to void or it has been   6 hours since you were discharged, return to the Emergency Room

## 2021-04-15 ENCOUNTER — OFFICE VISIT (OUTPATIENT)
Dept: UROLOGY | Facility: CLINIC | Age: 41
End: 2021-04-15

## 2021-04-15 VITALS — TEMPERATURE: 97.6 F | BODY MASS INDEX: 29.98 KG/M2 | HEIGHT: 67 IN | WEIGHT: 191 LBS

## 2021-04-15 DIAGNOSIS — R32 URINARY INCONTINENCE, UNSPECIFIED TYPE: Primary | ICD-10-CM

## 2021-04-15 PROCEDURE — 99024 POSTOP FOLLOW-UP VISIT: CPT | Performed by: UROLOGY

## 2021-04-15 NOTE — PROGRESS NOTES
Patient presents for follow-up for fill and void and packing removal today.  Her packing was removed without any signs of active bleeding.  She is doing well.  No nausea or vomiting.  Minimal abdominal pain.  Mild pelvic tenderness.  She should follow-up with me in a few weeks for pelvic exam to make sure she is healing well.

## 2021-06-21 DIAGNOSIS — Z98.1 STATUS POST CERVICAL SPINAL FUSION: ICD-10-CM

## 2021-06-21 DIAGNOSIS — M50.20 HERNIATION OF CERVICAL INTERVERTEBRAL DISC: ICD-10-CM

## 2021-06-21 DIAGNOSIS — M50.30 DEGENERATIVE DISC DISEASE, CERVICAL: ICD-10-CM

## 2021-06-21 DIAGNOSIS — M48.02 CERVICAL STENOSIS OF SPINAL CANAL: ICD-10-CM

## 2021-06-21 DIAGNOSIS — M96.1 POST LAMINECTOMY SYNDROME: ICD-10-CM

## 2021-06-21 RX ORDER — METHOCARBAMOL 750 MG/1
TABLET, FILM COATED ORAL
Qty: 30 TABLET | Refills: 0 | OUTPATIENT
Start: 2021-06-21

## 2021-06-21 NOTE — TELEPHONE ENCOUNTER
Provider:  Natalia  Caller:  Automated refill request  Surgery: ACDF, C5-6 and C6-7  Surgery Date:  05/17/2019  Last visit: 12/01/2020    Next visit: NA    Reason for call:         Automated refill request for Methocarbamol.       Requested Prescriptions     Pending Prescriptions Disp Refills   • methocarbamol (ROBAXIN) 750 MG tablet [Pharmacy Med Name: METHOCARBAMOL 750MG TABLETS] 30 tablet 0     Sig: TAKE 1 TABLET BY MOUTH EVERY NIGHT

## 2022-03-22 ENCOUNTER — TELEPHONE (OUTPATIENT)
Dept: OBSTETRICS AND GYNECOLOGY | Facility: CLINIC | Age: 42
End: 2022-03-22

## 2022-03-22 NOTE — TELEPHONE ENCOUNTER
----- Message from Silvia aZmudio MA sent at 3/22/2022  1:19 PM EDT -----  Patient is requesting a new IUD to be ordered. She will need IUD [employer]R. States she has had Annual and Pap 6 months ago ( [facility]).     Dr Tapia    Thank You

## 2022-03-23 NOTE — TELEPHONE ENCOUNTER
I am fine to replace her IUD if it actually needs replaced.  See how long the device has been in because Mirena is good for 7 years now.  If she is not having pain or bleeding symptoms and still has 2 years on her current device, I would not replace it at this point.  Thanks

## 2022-04-04 ENCOUNTER — OFFICE VISIT (OUTPATIENT)
Dept: SURGERY | Facility: CLINIC | Age: 42
End: 2022-04-04

## 2022-04-04 VITALS
BODY MASS INDEX: 28.09 KG/M2 | HEART RATE: 89 BPM | SYSTOLIC BLOOD PRESSURE: 152 MMHG | RESPIRATION RATE: 18 BRPM | DIASTOLIC BLOOD PRESSURE: 97 MMHG | OXYGEN SATURATION: 98 % | HEIGHT: 67 IN | TEMPERATURE: 98.2 F | WEIGHT: 179 LBS

## 2022-04-04 DIAGNOSIS — K64.1 GRADE II HEMORRHOIDS: ICD-10-CM

## 2022-04-04 DIAGNOSIS — K62.5 RECTAL BLEED: ICD-10-CM

## 2022-04-04 DIAGNOSIS — D12.6 ADENOMATOUS POLYP OF COLON, UNSPECIFIED PART OF COLON: Primary | ICD-10-CM

## 2022-04-04 PROCEDURE — 99203 OFFICE O/P NEW LOW 30 MIN: CPT | Performed by: SURGERY

## 2022-04-04 NOTE — PROGRESS NOTES
Patient: Kim Ray    YOB: 1980    Date: 04/04/2022    Primary Care Provider: Bonifacio Castillo MD    Chief Complaint   Patient presents with   • Hemorrhoids       SUBJECTIVE:    History of present illness: Patient states that she has had about a 2-year history of worsening hemorrhoid complaints.  Her complaints are mainly of pain and bleeding especially with bowel movements.  She denies any constipation or diarrhea.  The pain and bleeding are intermittent however.  She had a colonoscopy in 2018 with a relatively poor prep and multiple polyps were identified at that time.    The following portions of the patient's history were reviewed and updated as appropriate: allergies, current medications, past family history, past medical history, past social history, past surgical history and problem list.    Review of Systems   Constitutional: Negative for chills, diaphoresis, fatigue, fever and unexpected weight change.   HENT: Negative for dental problem, drooling, ear discharge, hearing loss, trouble swallowing and voice change.    Eyes: Negative for visual disturbance.   Respiratory: Negative for apnea, cough, choking, chest tightness, shortness of breath and wheezing.    Cardiovascular: Negative for chest pain, palpitations and leg swelling.   Gastrointestinal: Positive for blood in stool. Negative for abdominal distention, abdominal pain, anal bleeding, constipation, diarrhea, nausea, rectal pain and vomiting.   Endocrine: Negative for cold intolerance and heat intolerance.   Genitourinary: Negative for difficulty urinating, dysuria, flank pain, frequency and hematuria.   Musculoskeletal: Negative for back pain and gait problem.   Skin: Negative for color change, rash and wound.   Neurological: Negative for dizziness, seizures, syncope, speech difficulty, weakness, light-headedness, numbness and headaches.   Hematological: Negative for adenopathy. Does not bruise/bleed easily.    Psychiatric/Behavioral: Negative for agitation, behavioral problems and confusion. The patient is not nervous/anxious.        History:  Past Medical History:   Diagnosis Date   • Acid reflux    • Anxiety    • Arthritis     OA   • Bipolar disorder (HCC)    • Body piercing     ears   • Depression    • Diabetes mellitus (HCC)     type 2, diagnosed , checks fsbg as needed,    • Dysphasia     Reported intermittently has difficulty with swallowing pills and solid foods   • Elevated cholesterol    • Headache    • History of bronchitis    • History of chest pain 2021    Reported >1 year ago with visit to [facility] with dismissal to home. Reported was not cardiac related but was secondary to anxiety.   • Hyperlipidemia    • Hypertension    • Low back pain    • Neck pain    • JANNY (obstructive sleep apnea)     Patient reported prior sleep study and reported she is unsure if she was diagnosed with this.  Reported she has never been Rx CPAP.   • Pancreatitis    • Restless legs syndrome (RLS)    • Seizure (HCC)     20+ years ago, no meds    • Wears glasses     Reported she typically does not wear them       Past Surgical History:   Procedure Laterality Date   • ANTERIOR CERVICAL DISCECTOMY W/ FUSION N/A 2019    Procedure: CERVICAL DISCECTOMY ANTERIOR WITH FUSION C5-6 AND C6-7;  Surgeon: Chang Lackey MD;  Location: [facility];  Service: Neurosurgery   • CERVICAL BIOPSY  W/ LOOP ELECTRODE EXCISION      10/2000, 2011   •  SECTION      x1   • CHOLECYSTECTOMY     • COLONOSCOPY  2018   • MID-URETHRAL SLING WITH CYSTOSCOPY N/A 2021    Procedure: MID-URETHRAL SLING WITH CYSTOSCOPY;  Surgeon: Vishal Mercedes MD;  Location: [facility];  Service: Urology;  Laterality: N/A;   • TUBAL ABDOMINAL LIGATION     • WISDOM TOOTH EXTRACTION         Family History   Problem Relation Age of Onset   • Diabetes Father    • Hyperlipidemia Mother    • Hypertension Mother    • Thyroid disease Sister    •  "Diabetes Paternal Grandmother    • Skin cancer Maternal Grandfather        Social History     Tobacco Use   • Smoking status: Current Every Day Smoker     Packs/day: 1.00     Years: 27.00     Pack years: 27.00     Types: Cigarettes   • Smokeless tobacco: Never Used   Vaping Use   • Vaping Use: Never used   Substance Use Topics   • Alcohol use: Yes     Comment: rare   • Drug use: No       Allergies:  Allergies   Allergen Reactions   • Penicillins Other (See Comments)     \"felt like I was dying\", could not move body,   • Bactrim [Sulfamethoxazole-Trimethoprim] Nausea And Vomiting   • Chlorhexidine Rash   • Ciprofloxacin Rash   • Ibuprofen Rash   • Sulfa Antibiotics Nausea Only       Medications:    Current Outpatient Medications:   •  Accu-Chek Noemí Plus test strip, 1 each by Other route As Needed., Disp: , Rfl:   •  atorvastatin (LIPITOR) 40 MG tablet, Take 40 mg by mouth Daily., Disp: , Rfl:   •  buPROPion XL (WELLBUTRIN XL) 150 MG 24 hr tablet, Take 150 mg by mouth 2 (Two) Times a Day., Disp: , Rfl: 0  •  cetirizine (zyrTEC) 10 MG tablet, Take 10 mg by mouth Daily., Disp: , Rfl:   •  empagliflozin (JARDIANCE) 25 MG tablet tablet, Take 25 mg by mouth Daily., Disp: , Rfl:   •  escitalopram (LEXAPRO) 20 MG tablet, Take 20 mg by mouth Daily., Disp: , Rfl:   •  fenofibrate 160 MG tablet, Take 160 mg by mouth Daily., Disp: , Rfl:   •  gabapentin (NEURONTIN) 600 MG tablet, Take 600 mg by mouth 3 (Three) Times a Day., Disp: , Rfl:   •  HYDROcodone-acetaminophen (NORCO)  MG per tablet, Take 1 tablet by mouth Every 8 (Eight) Hours As Needed for Moderate Pain ., Disp: , Rfl: 0  •  Januvia 100 MG tablet, Take 100 mg by mouth Daily., Disp: , Rfl:   •  losartan (COZAAR) 100 MG tablet, Take 100 mg by mouth Daily., Disp: , Rfl:   •  metFORMIN (GLUCOPHAGE) 1000 MG tablet, Take 1,000 mg by mouth Every 12 (Twelve) Hours., Disp: , Rfl:   •  omeprazole (priLOSEC) 40 MG capsule, Take 40 mg by mouth Daily., Disp: , Rfl:   •  " "risperiDONE (risperDAL) 4 MG tablet, Take 4 mg by mouth Daily., Disp: , Rfl:   •  rOPINIRole (REQUIP) 3 MG tablet, Take 3 mg by mouth Every Night., Disp: , Rfl:   •  SUMAtriptan (IMITREX) 50 MG tablet, Take 50 mg by mouth Take As Directed., Disp: , Rfl:   •  Vitamin D, Cholecalciferol, 25 MCG (1000 UT) capsule, Take 1,000 Units by mouth Daily., Disp: , Rfl:   •  Diclofenac Sodium (VOLTAREN) 1 % gel gel, APPLY TO AFFECTED AREA 3 TO 4 TIMES A DAY TOPICALLY, Disp: , Rfl:   •  docusate sodium (Colace) 100 MG capsule, Take 1 capsule by mouth 2 (Two) Times a Day If taking oxycodone, Disp: 15 capsule, Rfl: 1  •  oxyCODONE (Roxicodone) 5 MG immediate release tablet, Take 1 tablet by mouth Every 6 (Six) Hours As Needed for Moderate or Severe Pain ., Disp: 10 tablet, Rfl: 0    Current Facility-Administered Medications:   •  levonorgestrel (MIRENA) 20 MCG/24HR IUD 1 each, 1 each, Intrauterine, Once, Janette Coleman PA-C    Facility-Administered Medications Ordered in Other Visits:   •  mupirocin (BACTROBAN) 2 % nasal ointment, , Nasal, BID, Марина Tran PA-C    OBJECTIVE:    Vital Signs:   Vitals:    04/04/22 1400   BP: 152/97   Pulse: 89   Resp: 18   Temp: 98.2 °F (36.8 °C)   TempSrc: Temporal   SpO2: 98%   Weight: 81.2 kg (179 lb)   Height: 170.2 cm (67\")       Physical Exam:   General Appearance:    Alert, cooperative, in no acute distress   Head:    Normocephalic, without obvious abnormality, atraumatic   Eyes:            Normal.  No scleral icterus.  PERRLA    Lungs:     Clear to auscultation,respirations regular, even and                  unlabored    Heart:    Regular rhythm and normal rate, normal S1 and S2, no            murmur   Abdomen:     Normal bowel sounds, no masses, no organomegaly, soft        non-tender, non-distended, no guarding,    Extremities:   Moves all extremities well, no edema, no cyanosis, no             redness   Skin:   No bleeding, bruising or rash   Neurologic:   Normal without gross " deficits.   Psychiatric: No evidence of depression or anxiety        Results Review:       Review of Systems was reviewed and confirmed as accurate as documented by the MA.    ASSESSMENT/PLAN:    1. Adenomatous polyp of colon, unspecified part of colon    2. Grade II hemorrhoids        For further evaluation of her hemorrhoids and due to the bleeding and her history of polyps I recommend a colonoscopy.  I explained the procedure to the patient as well as the risks of bleeding and perforation and they understand the ramifications of these potential complications and they wish to proceed.  I will further discuss her options as far as her hemorrhoids are concerned subsequent to the colonoscopy.    Electronically signed by Braydon Keller MD  04/04/22 08:04 EDT

## 2022-04-05 RX ORDER — MAGNESIUM CARB/ALUMINUM HYDROX 105-160MG
296 TABLET,CHEWABLE ORAL ONCE
Qty: 195 ML | Refills: 0 | Status: SHIPPED | OUTPATIENT
Start: 2022-04-05 | End: 2022-04-05

## 2022-04-05 RX ORDER — BISACODYL 5 MG
TABLET, DELAYED RELEASE (ENTERIC COATED) ORAL
Qty: 4 TABLET | Refills: 0 | Status: SHIPPED | OUTPATIENT
Start: 2022-04-05 | End: 2022-05-13 | Stop reason: HOSPADM

## 2022-04-05 RX ORDER — POLYETHYLENE GLYCOL 3350 17 G/17G
238 POWDER, FOR SOLUTION ORAL ONCE
Qty: 17 PACKET | Refills: 0 | Status: SHIPPED | OUTPATIENT
Start: 2022-04-05 | End: 2022-04-05

## 2022-04-06 PROBLEM — D12.6 ADENOMATOUS POLYP OF COLON: Status: ACTIVE | Noted: 2022-04-06

## 2022-04-06 PROBLEM — K64.1 GRADE II HEMORRHOIDS: Status: ACTIVE | Noted: 2022-04-06

## 2022-04-06 PROBLEM — K62.5 RECTAL BLEED: Status: ACTIVE | Noted: 2022-04-06

## 2022-05-10 ENCOUNTER — TELEPHONE (OUTPATIENT)
Dept: SURGERY | Facility: CLINIC | Age: 42
End: 2022-05-10

## 2022-05-12 NOTE — PRE-PROCEDURE INSTRUCTIONS
PAT phone history completed with pt for upcoming procedure on      PAT PASS GIVEN/REVIEWED WITH PT.  VERBALIZED UNDERSTANDING OF THE FOLLOWING:  DO NOT EAT, DRINK, SMOKE, USE SMOKELESS TOBACCO OR CHEW GUM AFTER MIDNIGHT THE NIGHT BEFORE SURGERY.  THIS ALSO INCLUDES HARD CANDIES AND MINTS.    DO NOT SHAVE THE AREA TO BE OPERATED ON AT LEAST 48 HOURS PRIOR TO THE PROCEDURE.  DO NOT WEAR MAKE UP OR NAIL POLISH.  DO NOT LEAVE IN ANY PIERCING OR WEAR JEWELRY THE DAY OF SURGERY.      DO NOT USE ADHESIVES IF YOU WEAR DENTURES.    DO NOT WEAR EYE CONTACTS; BRING IN YOUR GLASSES.    ONLY TAKE MEDICATION THE MORNING OF YOUR PROCEDURE IF INSTRUCTED BY YOUR SURGEON WITH ENOUGH WATER TO SWALLOW THE MEDICATION.  IF YOUR SURGEON DID NOT SPECIFY WHICH MEDICATIONS TO TAKE, YOU WILL NEED TO CALL THEIR OFFICE FOR FURTHER INSTRUCTIONS AND DO AS THEY INSTRUCT.    LEAVE ANYTHING YOU CONSIDER VALUABLE AT HOME.    YOU WILL NEED TO ARRANGE FOR SOMEONE TO DRIVE YOU HOME AFTER SURGERY.  IT IS RECOMMENDED THAT YOU DO NOT DRIVE, WORK, DRINK ALCOHOL OR MAKE MAJOR DECISIONS FOR AT LEAST 24 HOURS AFTER YOUR PROCEDURE IS COMPLETE.      THE DAY OF YOUR PROCEDURE, BRING IN THE FOLLOWING IF APPLICABLE:   PICTURE ID AND INSURANCE/MEDICARE OR MEDICAID CARDS/ANY CO-PAY THAT MAY BE DUE   COPY OF ADVANCED DIRECTIVE/LIVING WILL/POWER OR    CPAP/BIPAP/INHALERS   SKIN PREP SHEET   YOUR PREADMISSION TESTING PASS (IF NOT A PHONE HISTORY)           Pt instructed that pre-operative RN should visualize a bowel movement prior to procedure, and that is should be clear yellow liquid, not cloudy.  Pt asked to notify RN if need to have a BM while still in the waiting room, so the nurse can visualize the prep results.  If the nurse is unable to visualize a BM, Dr. Keller will be ordering an enema, to ensure visualization of returns.  Pt verbalized understanding.Pt instructed on PAT PASS information.  Pt verbalizes understanding.

## 2022-05-13 ENCOUNTER — HOSPITAL ENCOUNTER (OUTPATIENT)
Facility: HOSPITAL | Age: 42
Setting detail: HOSPITAL OUTPATIENT SURGERY
Discharge: HOME | End: 2022-05-13
Attending: SURGERY | Admitting: SURGERY

## 2022-05-13 ENCOUNTER — ANESTHESIA (OUTPATIENT)
Dept: GASTROENTEROLOGY | Facility: HOSPITAL | Age: 42
End: 2022-05-13

## 2022-05-13 ENCOUNTER — ANESTHESIA EVENT (OUTPATIENT)
Dept: GASTROENTEROLOGY | Facility: HOSPITAL | Age: 42
End: 2022-05-13

## 2022-05-13 VITALS
OXYGEN SATURATION: 97 % | WEIGHT: 182 LBS | DIASTOLIC BLOOD PRESSURE: 87 MMHG | BODY MASS INDEX: 28.56 KG/M2 | RESPIRATION RATE: 18 BRPM | HEIGHT: 67 IN | TEMPERATURE: 97.3 F | HEART RATE: 88 BPM | SYSTOLIC BLOOD PRESSURE: 123 MMHG

## 2022-05-13 LAB
B-HCG UR QL: NEGATIVE
EXPIRATION DATE: NORMAL
GLUCOSE BLDC GLUCOMTR-MCNC: 150 MG/DL (ref 70–130)
INTERNAL NEGATIVE CONTROL: NORMAL
INTERNAL POSITIVE CONTROL: NORMAL
Lab: NORMAL

## 2022-05-13 PROCEDURE — S0260 H&P FOR SURGERY: HCPCS | Performed by: SURGERY

## 2022-05-13 PROCEDURE — 81025 URINE PREGNANCY TEST: CPT | Performed by: SURGERY

## 2022-05-13 PROCEDURE — 25010000002 FENTANYL CITRATE (PF) 50 MCG/ML SOLUTION: Performed by: NURSE ANESTHETIST, CERTIFIED REGISTERED

## 2022-05-13 PROCEDURE — 25010000002 MIDAZOLAM PER 1MG: Performed by: NURSE ANESTHETIST, CERTIFIED REGISTERED

## 2022-05-13 PROCEDURE — 25010000002 PROPOFOL 10 MG/ML EMULSION: Performed by: NURSE ANESTHETIST, CERTIFIED REGISTERED

## 2022-05-13 PROCEDURE — 82962 GLUCOSE BLOOD TEST: CPT

## 2022-05-13 PROCEDURE — 25010000002 HALOPERIDOL LACTATE PER 5 MG: Performed by: NURSE ANESTHETIST, CERTIFIED REGISTERED

## 2022-05-13 RX ORDER — SODIUM CHLORIDE 0.9 % (FLUSH) 0.9 %
10 SYRINGE (ML) INJECTION AS NEEDED
Status: DISCONTINUED | OUTPATIENT
Start: 2022-05-13 | End: 2022-05-13 | Stop reason: HOSPADM

## 2022-05-13 RX ORDER — HALOPERIDOL 5 MG/ML
INJECTION INTRAMUSCULAR AS NEEDED
Status: DISCONTINUED | OUTPATIENT
Start: 2022-05-13 | End: 2022-05-13 | Stop reason: SURG

## 2022-05-13 RX ORDER — SIMETHICONE 20 MG/.3ML
EMULSION ORAL AS NEEDED
Status: DISCONTINUED | OUTPATIENT
Start: 2022-05-13 | End: 2022-05-13 | Stop reason: HOSPADM

## 2022-05-13 RX ORDER — SODIUM CHLORIDE, SODIUM LACTATE, POTASSIUM CHLORIDE, CALCIUM CHLORIDE 600; 310; 30; 20 MG/100ML; MG/100ML; MG/100ML; MG/100ML
1000 INJECTION, SOLUTION INTRAVENOUS CONTINUOUS
Status: DISCONTINUED | OUTPATIENT
Start: 2022-05-13 | End: 2022-05-13 | Stop reason: HOSPADM

## 2022-05-13 RX ORDER — MIDAZOLAM HYDROCHLORIDE 2 MG/2ML
INJECTION, SOLUTION INTRAMUSCULAR; INTRAVENOUS AS NEEDED
Status: DISCONTINUED | OUTPATIENT
Start: 2022-05-13 | End: 2022-05-13 | Stop reason: SURG

## 2022-05-13 RX ORDER — PROPOFOL 10 MG/ML
VIAL (ML) INTRAVENOUS AS NEEDED
Status: DISCONTINUED | OUTPATIENT
Start: 2022-05-13 | End: 2022-05-13 | Stop reason: SURG

## 2022-05-13 RX ORDER — FENTANYL CITRATE 50 UG/ML
INJECTION, SOLUTION INTRAMUSCULAR; INTRAVENOUS AS NEEDED
Status: DISCONTINUED | OUTPATIENT
Start: 2022-05-13 | End: 2022-05-13 | Stop reason: SURG

## 2022-05-13 RX ORDER — SODIUM CHLORIDE, SODIUM LACTATE, POTASSIUM CHLORIDE, CALCIUM CHLORIDE 600; 310; 30; 20 MG/100ML; MG/100ML; MG/100ML; MG/100ML
INJECTION, SOLUTION INTRAVENOUS CONTINUOUS PRN
Status: DISCONTINUED | OUTPATIENT
Start: 2022-05-13 | End: 2022-05-13 | Stop reason: SURG

## 2022-05-13 RX ORDER — KETAMINE HCL IN NACL, ISO-OSM 100MG/10ML
SYRINGE (ML) INJECTION AS NEEDED
Status: DISCONTINUED | OUTPATIENT
Start: 2022-05-13 | End: 2022-05-13 | Stop reason: SURG

## 2022-05-13 RX ORDER — ONDANSETRON 2 MG/ML
4 INJECTION INTRAMUSCULAR; INTRAVENOUS ONCE AS NEEDED
Status: DISCONTINUED | OUTPATIENT
Start: 2022-05-13 | End: 2022-05-13 | Stop reason: HOSPADM

## 2022-05-13 RX ADMIN — Medication 25 MG: at 12:27

## 2022-05-13 RX ADMIN — PROPOFOL 30 MG: 10 INJECTION, EMULSION INTRAVENOUS at 12:28

## 2022-05-13 RX ADMIN — PROPOFOL 30 MG: 10 INJECTION, EMULSION INTRAVENOUS at 12:32

## 2022-05-13 RX ADMIN — PROPOFOL 30 MG: 10 INJECTION, EMULSION INTRAVENOUS at 12:30

## 2022-05-13 RX ADMIN — HALOPERIDOL LACTATE 0.5 MG: 5 INJECTION, SOLUTION INTRAMUSCULAR at 12:33

## 2022-05-13 RX ADMIN — PROPOFOL 30 MG: 10 INJECTION, EMULSION INTRAVENOUS at 12:29

## 2022-05-13 RX ADMIN — SODIUM CHLORIDE, POTASSIUM CHLORIDE, SODIUM LACTATE AND CALCIUM CHLORIDE 1000 ML: 600; 310; 30; 20 INJECTION, SOLUTION INTRAVENOUS at 12:17

## 2022-05-13 RX ADMIN — SODIUM CHLORIDE, POTASSIUM CHLORIDE, SODIUM LACTATE AND CALCIUM CHLORIDE: 600; 310; 30; 20 INJECTION, SOLUTION INTRAVENOUS at 11:43

## 2022-05-13 RX ADMIN — MIDAZOLAM HYDROCHLORIDE 2 MG: 1 INJECTION, SOLUTION INTRAMUSCULAR; INTRAVENOUS at 12:24

## 2022-05-13 RX ADMIN — FENTANYL CITRATE 50 MCG: 50 INJECTION, SOLUTION INTRAMUSCULAR; INTRAVENOUS at 12:25

## 2022-05-13 RX ADMIN — FENTANYL CITRATE 50 MCG: 50 INJECTION, SOLUTION INTRAMUSCULAR; INTRAVENOUS at 12:32

## 2022-05-13 NOTE — ANESTHESIA PREPROCEDURE EVALUATION
Anesthesia Evaluation     Patient summary reviewed   no history of anesthetic complications:  NPO Solid Status: > 8 hours  NPO Liquid Status: > 2 hours           Airway   Mallampati: III  TM distance: <3 FB  Neck ROM: full  Possible difficult intubation  Comment: Moving neck does not exacerbate symptoms  Dental    (+) poor dentition    Pulmonary    (+) a smoker Current, COPD ( inc risk ), sleep apnea, decreased breath sounds,   Cardiovascular - normal exam  Exercise tolerance: good (4-7 METS)    ECG reviewed    (+) hypertension, hyperlipidemia,   (-) valvular problems/murmurs, dysrhythmias, angina ( hx of angina), DOECAD:  inc risk obese, anselmo, dm.      Neuro/Psych  (+) seizures (as child ), headaches, psychiatric history Anxiety, Depression and Bipolar,    GI/Hepatic/Renal/Endo    (+)  GERD well controlled, GI bleeding , diabetes mellitus well controlled,   (-) liver disease, no renal disease    Musculoskeletal     (+) arthralgias, back pain, chronic pain, myalgias, neck pain, radiculopathy Right upper extremity  Abdominal   (+) obese,    Substance History      OB/GYN          Other   arthritis,      ROS/Med Hx Other: rls  Hx of sz 20 yrs ago, no meds presently  Labs reviewed  ekg sr                    Anesthesia Plan    ASA 3     MAC   (Risks and benefits discussed including risk of aspiration, recall and dental damage. All patient questions answered.    Will continue with plan of care.)  intravenous induction     Anesthetic plan, all risks, benefits, and alternatives have been provided, discussed and informed consent has been obtained with: patient.  Use of blood products discussed with consented to blood products.

## 2022-05-13 NOTE — ANESTHESIA POSTPROCEDURE EVALUATION
Patient: Kim Ray    Procedure Summary     Date: 05/13/22 Room / Location: [facility] ENDOSCOPY 3 / [facility] ENDOSCOPY    Anesthesia Start: 1222 Anesthesia Stop:     Procedure: COLONOSCOPY (N/A Anus) Diagnosis:       Adenomatous polyp of colon, unspecified part of colon      Grade II hemorrhoids      Rectal bleed      (Adenomatous polyp of colon, unspecified part of colon [D12.6])      (Grade II hemorrhoids [K64.1])      (Rectal bleed [K62.5])    Surgeons: Braydon Keller MD Provider: Johny Grijalva CRNA    Anesthesia Type: MAC ASA Status: 3          Anesthesia Type: MAC    Vitals  No vitals data found for the desired time range.          Post Anesthesia Care and Evaluation    Patient location during evaluation: PHASE II  Patient participation: complete - patient participated  Level of consciousness: awake  Pain score: 0  Pain management: adequate  Airway patency: patent  Anesthetic complications: No anesthetic complications  PONV Status: none  Cardiovascular status: acceptable  Respiratory status: acceptable and nasal cannula  Hydration status: acceptable    Comments: vsss resp spont, reflexes intact, responsive, report given to pacu nurse. See rn note for postop vs

## 2022-05-23 ENCOUNTER — OFFICE VISIT (OUTPATIENT)
Dept: SURGERY | Facility: CLINIC | Age: 42
End: 2022-05-23

## 2022-05-23 VITALS
OXYGEN SATURATION: 98 % | RESPIRATION RATE: 16 BRPM | HEART RATE: 104 BPM | SYSTOLIC BLOOD PRESSURE: 109 MMHG | TEMPERATURE: 96.9 F | WEIGHT: 177.6 LBS | DIASTOLIC BLOOD PRESSURE: 76 MMHG | HEIGHT: 67 IN | BODY MASS INDEX: 27.88 KG/M2

## 2022-05-23 DIAGNOSIS — K64.1 GRADE II HEMORRHOIDS: Primary | ICD-10-CM

## 2022-05-23 PROCEDURE — 99213 OFFICE O/P EST LOW 20 MIN: CPT | Performed by: SURGERY

## 2022-05-23 RX ORDER — GABAPENTIN 600 MG/1
TABLET ORAL
COMMUNITY
Start: 2022-05-17

## 2022-05-23 NOTE — PROGRESS NOTES
Patient: Kim Ray    YOB: 1980    Date: 05/23/2022    Primary Care Provider: Bonifacio Castillo MD    Reason for Consultation: Follow-up colonoscopy    Chief Complaint   Patient presents with   • Follow-up     colonoscopy       History of present illness: Patient states that she has intermittent bleeding per rectum.  She had 1 episode right after the colonoscopy but none since then.    The following portions of the patient's history were reviewed and updated as appropriate: allergies, current medications, past family history, past medical history, past social history, past surgical history and problem list.    Review of Systems   Constitutional: Negative for chills, diaphoresis, fatigue, fever and unexpected weight change.   HENT: Negative for dental problem, drooling, ear discharge, hearing loss, trouble swallowing and voice change.    Eyes: Negative for visual disturbance.   Respiratory: Negative for apnea, cough, choking, chest tightness, shortness of breath and wheezing.    Cardiovascular: Negative for chest pain, palpitations and leg swelling.   Gastrointestinal: Positive for blood in stool. Negative for abdominal distention, abdominal pain, anal bleeding, constipation, diarrhea, nausea, rectal pain and vomiting.   Endocrine: Negative for cold intolerance and heat intolerance.   Genitourinary: Negative for difficulty urinating, dysuria, flank pain, frequency and hematuria.   Musculoskeletal: Negative for back pain and gait problem.   Skin: Negative for color change, rash and wound.   Neurological: Negative for dizziness, seizures, syncope, speech difficulty, weakness, light-headedness, numbness and headaches.   Hematological: Negative for adenopathy. Does not bruise/bleed easily.   Psychiatric/Behavioral: Negative for agitation, behavioral problems and confusion. The patient is not nervous/anxious.        Allergies:  Allergies   Allergen Reactions   • Penicillins Other (See  "Comments)     \"felt like I was dying\", could not move body,   • Bactrim [Sulfamethoxazole-Trimethoprim] Nausea And Vomiting   • Chlorhexidine Rash   • Ciprofloxacin Rash   • Ibuprofen Rash   • Sulfa Antibiotics Nausea Only       Medications:    Current Outpatient Medications:   •  Accu-Chek Noemí Plus test strip, 1 each by Other route As Needed., Disp: , Rfl:   •  atorvastatin (LIPITOR) 40 MG tablet, Take 40 mg by mouth Daily., Disp: , Rfl:   •  buPROPion XL (WELLBUTRIN XL) 150 MG 24 hr tablet, Take 150 mg by mouth 2 (Two) Times a Day., Disp: , Rfl: 0  •  cetirizine (zyrTEC) 10 MG tablet, Take 10 mg by mouth Daily., Disp: , Rfl:   •  empagliflozin (JARDIANCE) 25 MG tablet tablet, Take 25 mg by mouth Daily., Disp: , Rfl:   •  escitalopram (LEXAPRO) 20 MG tablet, Take 20 mg by mouth Daily., Disp: , Rfl:   •  fenofibrate 160 MG tablet, Take 160 mg by mouth Daily., Disp: , Rfl:   •  gabapentin (NEURONTIN) 600 MG tablet, , Disp: , Rfl:   •  Januvia 100 MG tablet, Take 100 mg by mouth Daily., Disp: , Rfl:   •  losartan (COZAAR) 100 MG tablet, Take 100 mg by mouth Daily., Disp: , Rfl:   •  metFORMIN (GLUCOPHAGE) 1000 MG tablet, Take 1,000 mg by mouth Every 12 (Twelve) Hours., Disp: , Rfl:   •  omeprazole (priLOSEC) 40 MG capsule, Take 40 mg by mouth Daily., Disp: , Rfl:   •  risperiDONE (risperDAL) 4 MG tablet, Take 4 mg by mouth Daily., Disp: , Rfl:   •  rOPINIRole (REQUIP) 3 MG tablet, Take 3 mg by mouth Every Night., Disp: , Rfl:   •  SUMAtriptan (IMITREX) 50 MG tablet, Take 50 mg by mouth Take As Directed., Disp: , Rfl:   •  Vitamin D, Cholecalciferol, 25 MCG (1000 UT) capsule, Take 1,000 Units by mouth Daily., Disp: , Rfl:     Current Facility-Administered Medications:   •  levonorgestrel (MIRENA) 20 MCG/24HR IUD 1 each, 1 each, Intrauterine, Once, Janette Coleman PA-C    Facility-Administered Medications Ordered in Other Visits:   •  mupirocin (BACTROBAN) 2 % nasal ointment, , Nasal, BID, Марина Tran, " "MARCELLUS    Vital Signs:  Vitals:    22 1417   BP: 109/76   Pulse: 104   Resp: 16   Temp: 96.9 °F (36.1 °C)   TempSrc: Temporal   SpO2: 98%   Weight: 80.6 kg (177 lb 9.6 oz)   Height: 170.2 cm (67\")     History:  Past Medical History:   Diagnosis Date   • Acid reflux    • Anxiety    • Arthritis     OA   • Bipolar disorder (HCC)    • Body piercing     ears   • Depression    • Diabetes mellitus (HCC)     type 2, diagnosed , checks fsbg as needed,    • Dysphasia     Reported intermittently has difficulty with swallowing pills and solid foods   • Elevated cholesterol    • Headache    • Hemorrhoids    • History of bronchitis    • History of chest pain 2021    Reported >1 year ago with visit to [facility] with dismissal to home. Reported was not cardiac related but was secondary to anxiety.   • Hyperlipidemia    • Hypertension    • Kidney stones    • Low back pain    • Neck pain    • JANNY (obstructive sleep apnea)     Patient reported prior sleep study and reported she is unsure if she was diagnosed with this.  Reported she has never been Rx CPAP.   • Pancreatitis    • Restless legs syndrome (RLS)    • Seasonal allergies    • Seizure (HCC)     20+ years ago, no meds    • Wears glasses     Reported she typically does not wear them       Past Surgical History:   Procedure Laterality Date   • ANTERIOR CERVICAL DISCECTOMY W/ FUSION N/A 2019    Procedure: CERVICAL DISCECTOMY ANTERIOR WITH FUSION C5-6 AND C6-7;  Surgeon: Chang Lackey MD;  Location: [facility] OR;  Service: Neurosurgery   • CERVICAL BIOPSY  W/ LOOP ELECTRODE EXCISION      10/2000, 2011   •  SECTION      x1   • CHOLECYSTECTOMY     • COLONOSCOPY  2018   • COLONOSCOPY N/A 2022    Procedure: COLONOSCOPY;  Surgeon: Braydon Keller MD;  Location: [facility] ENDOSCOPY;  Service: Gastroenterology;  Laterality: N/A;   • MID-URETHRAL SLING WITH CYSTOSCOPY N/A 2021    Procedure: MID-URETHRAL SLING WITH CYSTOSCOPY;  Surgeon: Daren" Vishal Avalos MD;  Location: [facility] OR;  Service: Urology;  Laterality: N/A;   • TUBAL ABDOMINAL LIGATION     • WISDOM TOOTH EXTRACTION         Family History   Problem Relation Age of Onset   • Diabetes Father    • Hyperlipidemia Mother    • Hypertension Mother    • Thyroid disease Sister    • Diabetes Paternal Grandmother    • Skin cancer Maternal Grandfather        Social History     Tobacco Use   • Smoking status: Current Every Day Smoker     Packs/day: 1.00     Years: 20.00     Pack years: 20.00     Types: Cigarettes   • Smokeless tobacco: Never Used   Vaping Use   • Vaping Use: Some days   • Substances: Nicotine   Substance Use Topics   • Alcohol use: Yes     Comment: rare   • Drug use: No        Physical Exam:   General Appearance:    Alert, cooperative, in no acute distress   Abdomen:    Benign            Cor:     Regular rate and rhythm  Lungs: Clear to auscultation    Results Review:   I reviewed the patient's new clinical results.  Colonoscopy was reviewed    Assessment / Plan:    1. Grade II hemorrhoids        Patient with a normal colonoscopy without polyps.  Recommend repeat colonoscopy in 5 years.    She intermittently has hemorrhoid bleeding and she states that she has some hemorrhoid pain intermittently.  She wishes to consider hemorrhoid surgery.  She understands the procedure as well as the risk of bleeding, infection including severe infections, incontinence, recurrent or additional hemorrhoid issues and further intervention in the future etc. At this time she wishes to consider her options and will call me if she wishes to proceed with hemorrhoid surgery.    Electronically signed by Braydon Keller MD  05/23/22

## (undated) DEVICE — JP PERF DRN SIL FLT 7MM FULL: Brand: CARDINAL HEALTH

## (undated) DEVICE — JACKSON-PRATT 100CC BULB RESERVOIR: Brand: CARDINAL HEALTH

## (undated) DEVICE — ENDOSCOPY PORT CONNECTOR FOR OLYMPUS® SCOPES: Brand: ERBE

## (undated) DEVICE — SYR LL TP 10ML STRL

## (undated) DEVICE — SUT SILK 2/0 TIES 18IN A185H

## (undated) DEVICE — RICH LAVH: Brand: MEDLINE INDUSTRIES, INC.

## (undated) DEVICE — SUT ETHLN 2/0 PS 18IN 585H

## (undated) DEVICE — HYBRID TUBING/CAP SET FOR OLYMPUS® SCOPES: Brand: ERBE

## (undated) DEVICE — PANTY KNIT MATERN L/XL

## (undated) DEVICE — PREP SOL DYNA-HEX CHG LIQ 4% BT 4OZ

## (undated) DEVICE — SOL NACL 0.9PCT 1000ML

## (undated) DEVICE — DRSNG WND BORDR/ADHS NONADHR/GZ LF 4X4IN STRL

## (undated) DEVICE — NDL SPINE 22G 31/2IN BLK

## (undated) DEVICE — 3M™ WARMING BLANKET, LOWER BODY, 10 PER CASE, 42568: Brand: BAIR HUGGER™

## (undated) DEVICE — 3M™ DURAPORE™ SURGICAL TAPE 1538-3, 3 INCH X 10 YARD (7,5CM X 9,1M), 4 ROLLS/BOX: Brand: 3M™ DURAPORE™

## (undated) DEVICE — SAFESECURE,SECUREMENT,FOLEY CATH,STERILE: Brand: MEDLINE

## (undated) DEVICE — INSTRUMENT 875-088 14MM DSTRCT PIN STRL: Brand: MEDTRONIC REUSABLE INSTRUMENT

## (undated) DEVICE — DIFFUSER: Brand: CORE, MAESTRO

## (undated) DEVICE — FLEXIBLE YANKAUER,MEDIUM TIP, NO VACUUM CONTROL: Brand: ARGYLE

## (undated) DEVICE — 450 ML BOTTLE OF 0.05% CHLORHEXIDINE GLUCONATE IN 99.95% STERILE WATER FOR IRRIGATION, USP AND APPLICATOR.: Brand: IRRISEPT ANTIMICROBIAL WOUND LAVAGE

## (undated) DEVICE — SLV SCD CALF HEMOFORCE DVT THERP REPROC MD

## (undated) DEVICE — SUT VIC 3/0 SH 27IN J416H

## (undated) DEVICE — PK NEURO DISC 10

## (undated) DEVICE — HOLDER: Brand: DEROYAL

## (undated) DEVICE — DRSNG WND BORDR/ADHS NONADHR/GZ LF 2X2IN STRL

## (undated) DEVICE — SKIN AFFIX SURG ADHESIVE 72/CS 0.55ML: Brand: MEDLINE

## (undated) DEVICE — MAGNETIC DRAPE: Brand: DEVON

## (undated) DEVICE — RETR RNG GEN2 31.8X18.3CM

## (undated) DEVICE — JELLY,LUBE,STERILE,FLIP TOP,TUBE,2-OZ: Brand: MEDLINE

## (undated) DEVICE — 3.0MM PRECISION NEURO (MATCH HEAD)

## (undated) DEVICE — TOTAL TRAY, 16FR 10ML SIL FOLEY, URN: Brand: MEDLINE

## (undated) DEVICE — GLV SURG SENSICARE PI LF PF 7.5 GRN STRL

## (undated) DEVICE — RETR STAY HK ELAS BLNT 12MM 50PK

## (undated) DEVICE — PLUG CATH W/CAP

## (undated) DEVICE — ST IRR CYSTO W/SPK 77IN LF

## (undated) DEVICE — BANDAGE,GAUZE,BULKEE II,4.5"X4.1YD,STRL: Brand: MEDLINE

## (undated) DEVICE — DISPOSABLE BIPOLAR FORCEPS 7 3/4" (19.7CM) SCOVILLE BAYONET, INSULATED, 1.5MM TIP AND 12 FT. (3.6M) CABLE: Brand: KIRWAN

## (undated) DEVICE — CVR HNDL LT SURG ACCSSRY BLU STRL

## (undated) DEVICE — ANTIBACTERIAL UNDYED BRAIDED (POLYGLACTIN 910), SYNTHETIC ABSORBABLE SUTURE: Brand: COATED VICRYL

## (undated) DEVICE — DRILL BIT 7080513 STERILE 13MM

## (undated) DEVICE — OIL CARTRIDGE: Brand: CORE, MAESTRO

## (undated) DEVICE — GLV SURG SENSICARE W/ALOE PF LF 7 STRL

## (undated) DEVICE — VLV SXN AIR/H2O ORCAPOD3 1P/U STRL

## (undated) DEVICE — ENCORE® LATEX MICRO SIZE 8, STERILE LATEX POWDER-FREE SURGICAL GLOVE: Brand: ENCORE

## (undated) DEVICE — 3M™ STERI-DRAPE™ INSTRUMENT POUCH 1018: Brand: STERI-DRAPE™

## (undated) DEVICE — ELECTRD BLD EDGE/INSUL1P 2.4X5.1MM STRL

## (undated) DEVICE — DRSNG WND GZ PAD BORDERED LF 4X5IN STRL

## (undated) DEVICE — Device

## (undated) DEVICE — ENCORE® LATEX MICRO SIZE 6, STERILE LATEX POWDER-FREE SURGICAL GLOVE: Brand: ENCORE

## (undated) DEVICE — NDL SPINE 20G 3 1/2 YEL STRL 1P/U